# Patient Record
Sex: FEMALE | Race: BLACK OR AFRICAN AMERICAN | NOT HISPANIC OR LATINO | Employment: FULL TIME | URBAN - METROPOLITAN AREA
[De-identification: names, ages, dates, MRNs, and addresses within clinical notes are randomized per-mention and may not be internally consistent; named-entity substitution may affect disease eponyms.]

---

## 2017-01-11 ENCOUNTER — TRANSFERRED RECORDS (OUTPATIENT)
Dept: HEALTH INFORMATION MANAGEMENT | Facility: CLINIC | Age: 57
End: 2017-01-11

## 2017-01-17 ENCOUNTER — OFFICE VISIT (OUTPATIENT)
Dept: FAMILY MEDICINE | Facility: CLINIC | Age: 57
End: 2017-01-17
Payer: COMMERCIAL

## 2017-01-17 VITALS
SYSTOLIC BLOOD PRESSURE: 124 MMHG | DIASTOLIC BLOOD PRESSURE: 78 MMHG | BODY MASS INDEX: 39.68 KG/M2 | HEART RATE: 56 BPM | OXYGEN SATURATION: 93 % | TEMPERATURE: 97.2 F | HEIGHT: 72 IN | WEIGHT: 293 LBS

## 2017-01-17 DIAGNOSIS — E55.9 VITAMIN D DEFICIENCY: ICD-10-CM

## 2017-01-17 DIAGNOSIS — I10 HTN (HYPERTENSION), BENIGN: Primary | ICD-10-CM

## 2017-01-17 DIAGNOSIS — R09.81 NASAL CONGESTION: ICD-10-CM

## 2017-01-17 DIAGNOSIS — F17.200 TOBACCO USE DISORDER: ICD-10-CM

## 2017-01-17 DIAGNOSIS — R53.83 FATIGUE, UNSPECIFIED TYPE: ICD-10-CM

## 2017-01-17 LAB
ANION GAP SERPL CALCULATED.3IONS-SCNC: 8 MMOL/L (ref 3–14)
BUN SERPL-MCNC: 15 MG/DL (ref 7–30)
CALCIUM SERPL-MCNC: 9 MG/DL (ref 8.5–10.1)
CHLORIDE SERPL-SCNC: 105 MMOL/L (ref 94–109)
CO2 SERPL-SCNC: 28 MMOL/L (ref 20–32)
CREAT SERPL-MCNC: 0.91 MG/DL (ref 0.52–1.04)
GFR SERPL CREATININE-BSD FRML MDRD: 64 ML/MIN/1.7M2
GLUCOSE SERPL-MCNC: 92 MG/DL (ref 70–99)
HGB BLD-MCNC: 13.1 G/DL (ref 11.7–15.7)
POTASSIUM SERPL-SCNC: 3.8 MMOL/L (ref 3.4–5.3)
SODIUM SERPL-SCNC: 141 MMOL/L (ref 133–144)

## 2017-01-17 PROCEDURE — 99214 OFFICE O/P EST MOD 30 MIN: CPT | Performed by: PHYSICIAN ASSISTANT

## 2017-01-17 PROCEDURE — 80048 BASIC METABOLIC PNL TOTAL CA: CPT | Performed by: PHYSICIAN ASSISTANT

## 2017-01-17 PROCEDURE — 85018 HEMOGLOBIN: CPT | Performed by: PHYSICIAN ASSISTANT

## 2017-01-17 PROCEDURE — 36415 COLL VENOUS BLD VENIPUNCTURE: CPT | Performed by: PHYSICIAN ASSISTANT

## 2017-01-17 RX ORDER — FLUTICASONE PROPIONATE 50 MCG
1-2 SPRAY, SUSPENSION (ML) NASAL DAILY
Qty: 1 BOTTLE | Refills: 11 | Status: SHIPPED | OUTPATIENT
Start: 2017-01-17 | End: 2017-09-06

## 2017-01-17 NOTE — PROGRESS NOTES
HPI: This is a 57 yo female here with cold sxs and f/u ER visit for a fall.   She is still having some aches and pains due to her fall on 12/12/16 but is getting better.  Currently here with sinus burning x 1 week  Sneezing, watery eyes  She is taking mucinex with some relief  She has nasal congestion and some green nasal d/c but that is clear now  She has some sinus pressure  Denies sore throat or cough or fever or chills.  She smokes 1/2 ppd or less.    Past Medical History   Diagnosis Date     HTN (hypertension), benign      Uterine fibroid      DVT (deep venous thrombosis) (H) 9/2012     Thrombosis of leg      Sept 2012     Past Surgical History   Procedure Laterality Date     Tonsillectomy       [place] teeth       Hysterectomy total abdominal  1/2/2013     Procedure: HYSTERECTOMY TOTAL ABDOMINAL;  TOTAL ABDOMINAL HYSTERECTOMY, multiple myomecty, right salpinectomy lysis of adhesions ;  Surgeon: Tariq Brewer MD;  Location: [facility] OR     Myomectomy uterus  1/2/2013     Procedure: MYOMECTOMY UTERUS;;  Surgeon: Tariq Brewer MD;  Location: [facility] OR     Hysterectomy, pap no longer indicated       Social History   Substance Use Topics     Smoking status: Current Every Day Smoker -- 0.50 packs/day     Smokeless tobacco: Never Used     Alcohol Use: 0.0 oz/week     0 Standard drinks or equivalent per week      Comment: occ       Current Outpatient Prescriptions   Medication Sig Dispense Refill     fluticasone (FLONASE) 50 MCG/ACT spray Spray 1-2 sprays into both nostrils daily 1 Bottle 11     atenolol (TENORMIN) 100 MG tablet TAKE 1 TABLET BY MOUTH DAILY. 90 tablet 1     lisinopril (PRINIVIL,ZESTRIL) 10 MG tablet Take 1 tablet (10 mg) by mouth daily 90 tablet 1     acetaminophen (TYLENOL) 500 MG tablet Take 2 tablets (1,000 mg) by mouth every 6 hours as needed for mild pain 50 tablet 0     COMPRESSION STOCKINGS 22-30mmHg, knee high stockings, wear as directed 2 each 1     Allergies   Allergen Reactions      "No Known Drug Allergy      FAMILY HISTORY NOTED AND REVIEWED    PHYSICAL EXAM:    /78 mmHg  Pulse 56  Temp(Src) 97.2  F (36.2  C) (Oral)  Ht 6' 1\" (1.854 m)  Wt 314 lb (142.429 kg)  BMI 41.44 kg/m2  SpO2 93%  LMP 09/06/2012  Breastfeeding? No    Patient appears non toxic  Ears: bilat cerumen removed with cerumen spoon.  TMs pearly grey with some effusion bilat  Nose: +erythema and edema of nasal mucosa  Pt sounds very congested which is chronic for her  No purulent mucus noted  Throat: no erythema or exudate  Lungs: CTA Bilat  Heart: RRR    Assessment and Plan:     (I10) HTN (hypertension), benign  (primary encounter diagnosis)  Comment: well controlled, cont same  Plan: Basic metabolic panel            (R09.81) Nasal congestion  Comment: chronic congestion, try flonase. If sinus sxs worsen, pt to call me in 2 days and would consider antibiotics at that time.  Plan: fluticasone (FLONASE) 50 MCG/ACT spray            (E55.9) Vitamin D deficiency  Comment: was told by gyn that she should take vit D but isn't  Plan: recd she take vit D 2000U/day    (R53.83) Fatigue, unspecified type  Comment:   Plan: Hemoglobin            (F17.200) Tobacco use disorder  Comment:   Plan: cessation discussed      Yolette Prabhakar PA-C        "

## 2017-01-17 NOTE — NURSING NOTE
"Chief Complaint   Patient presents with     Sinus Problem       Initial /78 mmHg  Pulse 56  Temp(Src) 97.2  F (36.2  C) (Oral)  Ht 6' 1\" (1.854 m)  Wt 314 lb (142.429 kg)  BMI 41.44 kg/m2  SpO2 93%  LMP 09/06/2012  Breastfeeding? No Estimated body mass index is 41.44 kg/(m^2) as calculated from the following:    Height as of this encounter: 6' 1\" (1.854 m).    Weight as of this encounter: 314 lb (142.429 kg).  BP completed using cuff size: large    "

## 2017-01-17 NOTE — Clinical Note
[facility]  [address] [facility]  [address]  [place]  [address]  Tel: [phone]    January 18, 2017    Erlinda Luis Carlos  [address]   [facility] [address]        Dear Ms. Aldridge,      It was a pleasure seeing you.  I wanted to get back to you with your test results.  I have enclosed a copy for your records.    Your blood sugar, electrolytes, kidney function and hemoglobin were all in normal range.    Yolette Prabhakar PA-C      Results for orders placed or performed in visit on 01/17/17   Basic metabolic panel   Result Value Ref Range    Sodium 141 133 - 144 mmol/L    Potassium 3.8 3.4 - 5.3 mmol/L    Chloride 105 94 - 109 mmol/L    Carbon Dioxide 28 20 - 32 mmol/L    Anion Gap 8 3 - 14 mmol/L    Glucose 92 70 - 99 mg/dL    Urea Nitrogen 15 7 - 30 mg/dL    Creatinine 0.91 0.52 - 1.04 mg/dL    GFR Estimate 64 >60 mL/min/1.7m2    GFR Estimate If Black 78 >60 mL/min/1.7m2    Calcium 9.0 8.5 - 10.1 mg/dL   Hemoglobin   Result Value Ref Range    Hemoglobin 13.1 11.7 - 15.7 g/dL

## 2017-01-17 NOTE — MR AVS SNAPSHOT
"              After Visit Summary   2017    Erlinda Aldridge    MRN: [id number]           Patient Information     Date Of Birth          1960        Visit Information        Provider Department      2017 10:00 AM Yolette Prabhakar PA-C [facility]        Today's Diagnoses     HTN (hypertension), benign    -  1     Nasal congestion            Follow-ups after your visit        Who to contact     If you have questions or need follow up information about today's clinic visit or your schedule please contact [facility] directly at [phone].  Normal or non-critical lab and imaging results will be communicated to you by Truevisionhart, letter or phone within 4 business days after the clinic has received the results. If you do not hear from us within 7 days, please contact the clinic through Truevisionhart or phone. If you have a critical or abnormal lab result, we will notify you by phone as soon as possible.  Submit refill requests through [employer] or call your pharmacy and they will forward the refill request to us. Please allow 3 business days for your refill to be completed.          Additional Information About Your Visit        MyChart Information     [employer] lets you send messages to your doctor, view your test results, renew your prescriptions, schedule appointments and more. To sign up, go to [email or web address] . Click on \"Log in\" on the left side of the screen, which will take you to the Welcome page. Then click on \"Sign up Now\" on the right side of the page.     You will be asked to enter the access code listed below, as well as some personal information. Please follow the directions to create your username and password.     Your access code is: [email or web address]  Expires: 3/13/2017  9:37 PM     Your access code will  in 90 days. If you need help or a new code, please call your [facility] or [phone].        Care EveryWhere ID     This is your Care EveryWhere ID. This " could be used by other organizations to access your [place] medical records  [id number]        Your Vitals Were     Last Period                   09/06/2012            Blood Pressure from Last 3 Encounters:   01/17/17 124/78   12/13/16 142/83   03/15/16 131/80    Weight from Last 3 Encounters:   12/13/16 302 lb (136.986 kg)   03/15/16 309 lb (140.161 kg)   12/15/15 322 lb (146.058 kg)              We Performed the Following     Basic metabolic panel          Today's Medication Changes          These changes are accurate as of: 1/17/17 10:44 AM.  If you have any questions, ask your nurse or doctor.               Start taking these medicines.        Dose/Directions    fluticasone 50 MCG/ACT spray   Commonly known as:  FLONASE   Used for:  Nasal congestion        Dose:  1-2 spray   Spray 1-2 sprays into both nostrils daily   Quantity:  1 Bottle   Refills:  11            Where to get your medicines      These medications were sent to [facility] [address] [place] [address] AT [address] [address]     Phone:  [phone]    - fluticasone 50 MCG/ACT spray             Primary Care Provider Office Phone # Fax #    Yolette Prabhakar PA-C [phone]       [facility] [address]  [facility] [address]        Thank you!     Thank you for choosing [facility]  for your care. Our goal is always to provide you with excellent care. Hearing back from our patients is one way we can continue to improve our services. Please take a few minutes to complete the written survey that you may receive in the mail after your visit with us. Thank you!             Your Updated Medication List - Protect others around you: Learn how to safely use, store and throw away your medicines at www.disposemymeds.org.          This list is accurate as of: 1/17/17 10:44 AM.  Always use your most recent med list.                   Brand Name Dispense  Instructions for use    acetaminophen 500 MG tablet    TYLENOL    50 tablet    Take 2 tablets (1,000 mg) by mouth every 6 hours as needed for mild pain       atenolol 100 MG tablet    TENORMIN    90 tablet    TAKE 1 TABLET BY MOUTH DAILY.       COMPRESSION STOCKINGS     2 each    22-30mmHg, knee high stockings, wear as directed       fluticasone 50 MCG/ACT spray    FLONASE    1 Bottle    Spray 1-2 sprays into both nostrils daily       lisinopril 10 MG tablet    PRINIVIL/ZESTRIL    90 tablet    Take 1 tablet (10 mg) by mouth daily

## 2017-01-18 NOTE — PROGRESS NOTES
Quick Note:    It was a pleasure seeing you. I wanted to get back to you with your test results. I have enclosed a copy for your records.    Your blood sugar, electrolytes, kidney function and hemoglobin were all in normal range.    Yolette Prabhakar PA-C    ______

## 2017-02-23 DIAGNOSIS — I10 HTN (HYPERTENSION), BENIGN: ICD-10-CM

## 2017-02-23 DIAGNOSIS — R05.9 COUGH: Primary | ICD-10-CM

## 2017-02-23 RX ORDER — CODEINE PHOSPHATE AND GUAIFENESIN 10; 100 MG/5ML; MG/5ML
2 SOLUTION ORAL EVERY 4 HOURS PRN
Qty: 120 ML | Refills: 0 | Status: SHIPPED | OUTPATIENT
Start: 2017-02-23 | End: 2017-05-31

## 2017-02-23 RX ORDER — ATENOLOL 100 MG/1
TABLET ORAL
Qty: 90 TABLET | Refills: 2 | Status: SHIPPED | OUTPATIENT
Start: 2017-02-23 | End: 2017-05-31

## 2017-02-23 NOTE — TELEPHONE ENCOUNTER
Reason for call:  Patient reporting a symptom    Symptom or request: Cold, Cough, fever working itself out, Tired    Duration (how long have symptoms been present): since last week    Have you been treated for this before? No    Additional comments: Please call to discuss    Phone Number patient can be reached at:  Home number on file [phone] (home)    Best Time:  anytime    Can we leave a detailed message on this number:  YES    Call taken on 2/23/2017 at 12:47 PM by Mehran Giang

## 2017-02-23 NOTE — TELEPHONE ENCOUNTER
I called patient and spoke with her.   Reports of having cough, fever last week, and feeling fatigue.   Patient reports taking cough syrup, Niquil, tylenol.   Was difficult to hear patient as she had me on speaker phone.   I asked her to take me off speaker phone.     Patient reports not feeling feverish any more but that she still continues to have cough and fatigue.   Patient is requesting robitussin w/ codeine as she reports taking this last year for cough.     I advised fluids and that it can take up to 10 days for flu like symptoms to resolve.   Patient also requesting Atenolol refill.    PCP: Please advise on Robitussin script.     Annie Jaimes RN      Prescription approved per [facility] Refill Protocol.    atenolol (TENORMIN) 100 MG tablet      Last Written Prescription Date: 8/5/16  Last Fill Quantity: 90, # refills: 1  Last Office Visit with [facility], [facility] or [facility] prescribing provider: 1/17/17       Potassium   Date Value Ref Range Status   01/17/2017 3.8 3.4 - 5.3 mmol/L Final     Creatinine   Date Value Ref Range Status   01/17/2017 0.91 0.52 - 1.04 mg/dL Final     BP Readings from Last 3 Encounters:   01/17/17 124/78   12/13/16 142/83   03/15/16 131/80

## 2017-03-14 DIAGNOSIS — I10 ESSENTIAL HYPERTENSION: ICD-10-CM

## 2017-03-14 NOTE — TELEPHONE ENCOUNTER
Pending Prescriptions:                       Disp   Refills    atenolol (TENORMIN) 100 MG tablet [Pharmac*90 tab*0        Sig: TAKE 1 TABLET BY MOUTH DAILY.    Last Written Prescription Date: 2/23/2017  Last Fill Quantity: 90 tablet, # refills: 2    Last Office Visit with [facility], [facility] or [facility] prescribing provider:  1/17/2017   Future Office Visit:        BP Readings from Last 3 Encounters:   01/17/17 124/78   12/13/16 142/83   03/15/16 131/80

## 2017-03-15 RX ORDER — ATENOLOL 100 MG/1
TABLET ORAL
Qty: 90 TABLET | Refills: 2 | Status: SHIPPED | OUTPATIENT
Start: 2017-03-15 | End: 2017-07-19

## 2017-03-15 NOTE — TELEPHONE ENCOUNTER
Prescription approved per [facility] Refill Protocol.  Reanna Anderson RN

## 2017-05-31 ENCOUNTER — OFFICE VISIT (OUTPATIENT)
Dept: FAMILY MEDICINE | Facility: CLINIC | Age: 57
End: 2017-05-31
Payer: COMMERCIAL

## 2017-05-31 VITALS
WEIGHT: 293 LBS | HEART RATE: 54 BPM | HEIGHT: 72 IN | TEMPERATURE: 98.7 F | OXYGEN SATURATION: 96 % | BODY MASS INDEX: 39.68 KG/M2 | DIASTOLIC BLOOD PRESSURE: 80 MMHG | SYSTOLIC BLOOD PRESSURE: 144 MMHG

## 2017-05-31 DIAGNOSIS — F17.200 TOBACCO USE DISORDER: ICD-10-CM

## 2017-05-31 DIAGNOSIS — Z12.11 SCREEN FOR COLON CANCER: ICD-10-CM

## 2017-05-31 DIAGNOSIS — K21.9 GASTROESOPHAGEAL REFLUX DISEASE, ESOPHAGITIS PRESENCE NOT SPECIFIED: ICD-10-CM

## 2017-05-31 DIAGNOSIS — E78.5 HYPERLIPIDEMIA LDL GOAL <130: ICD-10-CM

## 2017-05-31 DIAGNOSIS — I10 HTN (HYPERTENSION), BENIGN: ICD-10-CM

## 2017-05-31 DIAGNOSIS — Z11.59 NEED FOR HEPATITIS C SCREENING TEST: ICD-10-CM

## 2017-05-31 DIAGNOSIS — L72.3 SEBACEOUS CYST: Primary | ICD-10-CM

## 2017-05-31 PROCEDURE — 86803 HEPATITIS C AB TEST: CPT | Performed by: PHYSICIAN ASSISTANT

## 2017-05-31 PROCEDURE — 80061 LIPID PANEL: CPT | Performed by: PHYSICIAN ASSISTANT

## 2017-05-31 PROCEDURE — 36415 COLL VENOUS BLD VENIPUNCTURE: CPT | Performed by: PHYSICIAN ASSISTANT

## 2017-05-31 PROCEDURE — 99214 OFFICE O/P EST MOD 30 MIN: CPT | Performed by: PHYSICIAN ASSISTANT

## 2017-05-31 RX ORDER — OMEPRAZOLE 40 MG/1
40 CAPSULE, DELAYED RELEASE ORAL DAILY
Qty: 30 CAPSULE | Refills: 6 | Status: SHIPPED | OUTPATIENT
Start: 2017-05-31 | End: 2020-05-20

## 2017-05-31 NOTE — NURSING NOTE
"Chief Complaint   Patient presents with     Derm Problem     boil right side of back       Initial /78 (BP Location: Left arm, Cuff Size: Adult Large)  Pulse 54  Temp 98.7  F (37.1  C) (Oral)  Ht 6' 1\" (1.854 m)  Wt (!) 322 lb (146.1 kg)  LMP 09/06/2012  SpO2 96%  BMI 42.48 kg/m2 Estimated body mass index is 42.48 kg/(m^2) as calculated from the following:    Height as of this encounter: 6' 1\" (1.854 m).    Weight as of this encounter: 322 lb (146.1 kg).  Medication Reconciliation: complete.      Komal Parsons CMA      "

## 2017-05-31 NOTE — PATIENT INSTRUCTIONS
Recheck your blood pressure and call in readings.  I will recheck your cholesterol  Avoid fried foods, watch the portion sizes and quit smoking  Try to exercise regularly

## 2017-05-31 NOTE — LETTER
[facility]  [address] [facility]  [address]  [place]  [address]  Tel: [phone]    June 2, 2017    Erlinda Aldridge  [address]  [address]   [facility] [address]        Molly,     Your cholesterol level is better than it was 7 months ago but still high.     Your LDL goal is <130.     I would like you to start a low dose of a cholesterol medication called Lipitor.   This medication is taken at bedtime.   I will send in a prescription to your pharmacy.     Have your cholesterol rechecked fasting in 3-6 months.     Your hepatitis C screening test was negative.     If you have any further questions or problems, please contact our office.      Sincerely,    Yolette Prabhakar PA-C          Enclosure: Lab Results                    Results for orders placed or performed in visit on 05/31/17   Hepatitis C Screen Reflex to HCV RNA Quant and Genotype   Result Value Ref Range    Hepatitis C Antibody  NR     Nonreactive   Assay performance characteristics have not been established for newborns,   infants, and children     Lipid Profile   Result Value Ref Range    Cholesterol 209 (H) <200 mg/dL    Triglycerides 109 <150 mg/dL    HDL Cholesterol 44 (L) >49 mg/dL    LDL Cholesterol Calculated 143 (H) <100 mg/dL    Non HDL Cholesterol 165 (H) <130 mg/dL

## 2017-05-31 NOTE — MR AVS SNAPSHOT
After Visit Summary   5/31/2017    Erlinda Aldridge    MRN: [id number]           Patient Information     Date Of Birth          1960        Visit Information        Provider Department      5/31/2017 11:00 AM Yolette Prabhakar PA-C [facility]        Today's Diagnoses     Sebaceous cyst    -  1    HTN (hypertension), benign        Hyperlipidemia LDL goal <130        Tobacco use disorder        Screen for colon cancer        Need for hepatitis C screening test          Care Instructions    Recheck your blood pressure and call in readings.  I will recheck your cholesterol  Avoid fried foods, watch the portion sizes and quit smoking  Try to exercise regularly          Follow-ups after your visit        Future tests that were ordered for you today     Open Future Orders        Priority Expected Expires Ordered    Fecal colorectal cancer screen FIT - Future (S+30) Routine 6/21/2017 6/30/2017 5/31/2017            Who to contact     If you have questions or need follow up information about today's clinic visit or your schedule please contact [facility] directly at [phone].  Normal or non-critical lab and imaging results will be communicated to you by MyChart, letter or phone within 4 business days after the clinic has received the results. If you do not hear from us within 7 days, please contact the clinic through MoviePasshart or phone. If you have a critical or abnormal lab result, we will notify you by phone as soon as possible.  Submit refill requests through [employer] or call your pharmacy and they will forward the refill request to us. Please allow 3 business days for your refill to be completed.          Additional Information About Your Visit        Care EveryWhere ID     This is your Care EveryWhere ID. This could be used by other organizations to access your [place] medical records  [id number]        Your Vitals Were     Pulse Temperature Height Last Period Pulse Oximetry  "BMI (Body Mass Index)    54 98.7  F (37.1  C) (Oral) 6' 1\" (1.854 m) 09/06/2012 96% 42.48 kg/m2       Blood Pressure from Last 3 Encounters:   05/31/17 144/80   01/17/17 124/78   12/13/16 142/83    Weight from Last 3 Encounters:   05/31/17 (!) 322 lb (146.1 kg)   01/17/17 (!) 314 lb (142.4 kg)   12/13/16 (!) 302 lb (137 kg)              We Performed the Following     Hepatitis C Screen Reflex to HCV RNA Quant and Genotype     Lipid Profile          Today's Medication Changes          These changes are accurate as of: 5/31/17 11:38 AM.  If you have any questions, ask your nurse or doctor.               These medicines have changed or have updated prescriptions.        Dose/Directions    atenolol 100 MG tablet   Commonly known as:  TENORMIN   This may have changed:  Another medication with the same name was removed. Continue taking this medication, and follow the directions you see here.   Used for:  Essential hypertension   Changed by:  Yolette Prabhakar PA-C        TAKE 1 TABLET BY MOUTH DAILY.   Quantity:  90 tablet   Refills:  2         Stop taking these medicines if you haven't already. Please contact your care team if you have questions.     guaiFENesin-codeine 100-10 MG/5ML Soln solution   Commonly known as:  ROBITUSSIN AC   Stopped by:  Yolette Prabhakar PA-C                    Primary Care Provider Office Phone # Fax #    Yolette Prabhakar PA-C [phone]       [facility] [address]  [facility] [address]        Thank you!     Thank you for choosing [facility]  for your care. Our goal is always to provide you with excellent care. Hearing back from our patients is one way we can continue to improve our services. Please take a few minutes to complete the written survey that you may receive in the mail after your visit with us. Thank you!             Your Updated Medication List - Protect others around you: Learn how to safely use, store and throw away your medicines " at www.disposemymeds.org.          This list is accurate as of: 5/31/17 11:38 AM.  Always use your most recent med list.                   Brand Name Dispense Instructions for use    acetaminophen 500 MG tablet    TYLENOL    50 tablet    Take 2 tablets (1,000 mg) by mouth every 6 hours as needed for mild pain       atenolol 100 MG tablet    TENORMIN    90 tablet    TAKE 1 TABLET BY MOUTH DAILY.       COMPRESSION STOCKINGS     2 each    22-30mmHg, knee high stockings, wear as directed       fluticasone 50 MCG/ACT spray    FLONASE    1 Bottle    Spray 1-2 sprays into both nostrils daily       lisinopril 10 MG tablet    PRINIVIL/ZESTRIL    90 tablet    TAKE 1 TABLET(10 MG) BY MOUTH DAILY

## 2017-06-01 LAB
CHOLEST SERPL-MCNC: 209 MG/DL
HCV AB SERPL QL IA: NORMAL
HDLC SERPL-MCNC: 44 MG/DL
LDLC SERPL CALC-MCNC: 143 MG/DL
NONHDLC SERPL-MCNC: 165 MG/DL
TRIGL SERPL-MCNC: 109 MG/DL

## 2017-06-02 RX ORDER — ATORVASTATIN CALCIUM 10 MG/1
10 TABLET, FILM COATED ORAL DAILY
Qty: 30 TABLET | Refills: 3 | Status: SHIPPED | OUTPATIENT
Start: 2017-06-02 | End: 2018-08-01

## 2017-06-02 NOTE — PROGRESS NOTES
Molly,    Your cholesterol level is better than it was 7 months ago but still high.    Your LDL goal is <130.   I would like you to start a low dose of a cholesterol medication called Lipitor.  This medication is taken at bedtime.  I will send in a prescription to your pharmacy.   Have your cholesterol rechecked fasting in 3-6 months.    Your hepatitis C screening test was negative.    Yolette Prabhakar PA-C

## 2017-07-18 ENCOUNTER — TELEPHONE (OUTPATIENT)
Dept: FAMILY MEDICINE | Facility: CLINIC | Age: 57
End: 2017-07-18

## 2017-07-18 DIAGNOSIS — I10 BENIGN ESSENTIAL HYPERTENSION: Primary | ICD-10-CM

## 2017-07-19 RX ORDER — METOPROLOL SUCCINATE 100 MG/1
100 TABLET, EXTENDED RELEASE ORAL DAILY
Qty: 90 TABLET | Refills: 1 | Status: SHIPPED | OUTPATIENT
Start: 2017-07-19 | End: 2018-01-31

## 2017-07-25 DIAGNOSIS — I10 HTN (HYPERTENSION), BENIGN: ICD-10-CM

## 2017-07-25 NOTE — TELEPHONE ENCOUNTER
lisinopril (PRINIVIL/ZESTRIL) 10 MG tablet        Last Written Prescription Date: 2/9/2017  Last Fill Quantity: 90, # refills: 1  Last Office Visit with [facility], [facility] or [facility] prescribing provider: 5/31/2017       Potassium   Date Value Ref Range Status   01/17/2017 3.8 3.4 - 5.3 mmol/L Final     Creatinine   Date Value Ref Range Status   01/17/2017 0.91 0.52 - 1.04 mg/dL Final     BP Readings from Last 3 Encounters:   05/31/17 144/80   01/17/17 124/78   12/13/16 142/83

## 2017-07-27 RX ORDER — LISINOPRIL 10 MG/1
TABLET ORAL
Qty: 90 TABLET | Refills: 1 | Status: SHIPPED | OUTPATIENT
Start: 2017-07-27 | End: 2018-03-05

## 2017-07-27 NOTE — TELEPHONE ENCOUNTER
Prescription approved per [facility] Refill Protocol.  Rossana Walker RN

## 2017-09-06 ENCOUNTER — OFFICE VISIT (OUTPATIENT)
Dept: FAMILY MEDICINE | Facility: CLINIC | Age: 57
End: 2017-09-06
Payer: COMMERCIAL

## 2017-09-06 VITALS
SYSTOLIC BLOOD PRESSURE: 136 MMHG | TEMPERATURE: 97.2 F | BODY MASS INDEX: 39.68 KG/M2 | WEIGHT: 293 LBS | HEART RATE: 62 BPM | HEIGHT: 72 IN | OXYGEN SATURATION: 95 % | DIASTOLIC BLOOD PRESSURE: 87 MMHG

## 2017-09-06 DIAGNOSIS — J01.90 ACUTE SINUSITIS WITH SYMPTOMS > 10 DAYS: Primary | ICD-10-CM

## 2017-09-06 DIAGNOSIS — R09.81 NASAL CONGESTION: ICD-10-CM

## 2017-09-06 PROCEDURE — 99213 OFFICE O/P EST LOW 20 MIN: CPT | Performed by: PHYSICIAN ASSISTANT

## 2017-09-06 RX ORDER — FLUTICASONE PROPIONATE 50 MCG
1-2 SPRAY, SUSPENSION (ML) NASAL DAILY
Qty: 1 BOTTLE | Refills: 11 | Status: SHIPPED | OUTPATIENT
Start: 2017-09-06 | End: 2021-01-28

## 2017-09-06 NOTE — MR AVS SNAPSHOT
"              After Visit Summary   2017    Erlinda Aldridge    MRN: [id number]           Patient Information     Date Of Birth          1960        Visit Information        Provider Department      2017 3:30 PM Yolette Prabhakar PA-C [facility]        Today's Diagnoses     Acute sinusitis with symptoms > 10 days    -  1    Nasal congestion           Follow-ups after your visit        Who to contact     If you have questions or need follow up information about today's clinic visit or your schedule please contact [facility] directly at [phone].  Normal or non-critical lab and imaging results will be communicated to you by LucidMediahart, letter or phone within 4 business days after the clinic has received the results. If you do not hear from us within 7 days, please contact the clinic through LucidMediahart or phone. If you have a critical or abnormal lab result, we will notify you by phone as soon as possible.  Submit refill requests through Ripstone or call your pharmacy and they will forward the refill request to us. Please allow 3 business days for your refill to be completed.          Additional Information About Your Visit        MyChart Information     Ripstone lets you send messages to your doctor, view your test results, renew your prescriptions, schedule appointments and more. To sign up, go to [email or web address] . Click on \"Log in\" on the left side of the screen, which will take you to the Welcome page. Then click on \"Sign up Now\" on the right side of the page.     You will be asked to enter the access code listed below, as well as some personal information. Please follow the directions to create your username and password.     Your access code is: [email or web address]  Expires: 2017  3:54 PM     Your access code will  in 90 days. If you need help or a new code, please call your [facility] or [phone].        Care EveryWhere ID     This is your Care EveryWhere ID. " "This could be used by other organizations to access your House medical records  [id number]        Your Vitals Were     Pulse Temperature Height Last Period Pulse Oximetry BMI (Body Mass Index)    62 97.2  F (36.2  C) (Oral) 6' 1\" (1.854 m) 09/06/2012 95% 41.69 kg/m2       Blood Pressure from Last 3 Encounters:   09/06/17 136/87   05/31/17 144/80   01/17/17 124/78    Weight from Last 3 Encounters:   09/06/17 (!) 316 lb (143.3 kg)   05/31/17 (!) 322 lb (146.1 kg)   01/17/17 (!) 314 lb (142.4 kg)              Today, you had the following     No orders found for display         Today's Medication Changes          These changes are accurate as of: 9/6/17  3:54 PM.  If you have any questions, ask your nurse or doctor.               Start taking these medicines.        Dose/Directions    amoxicillin-clavulanate 875-125 MG per tablet   Commonly known as:  AUGMENTIN   Used for:  Acute sinusitis with symptoms > 10 days   Started by:  Yolette Prabhakar PA-C        Dose:  1 tablet   Take 1 tablet by mouth 2 times daily   Quantity:  20 tablet   Refills:  0            Where to get your medicines      These medications were sent to [facility] [address] AT [address] [address]     Phone:  [phone]     amoxicillin-clavulanate 875-125 MG per tablet    fluticasone 50 MCG/ACT spray                Primary Care Provider Office Phone # Fax #    Yolette Prabhakar PA-C [phone] [address]  [facility] [address]        Equal Access to Services     IBRAHIMA BRANDT AH: Wicho Taylor, waelijah mcnamara, qajackieta kaalmada adekatlynda, harley diaz. [facility] [phone].    ATENCIÓN: Si habla español, tiene a orozco disposición servicios gratuitos de asistencia lingüística. Llame al [phone].    We comply with applicable federal civil rights laws and [place] laws. We do not discriminate on the " basis of race, color, national origin, age, disability sex, sexual orientation or gender identity.            Thank you!     Thank you for choosing [facility]  for your care. Our goal is always to provide you with excellent care. Hearing back from our patients is one way we can continue to improve our services. Please take a few minutes to complete the written survey that you may receive in the mail after your visit with us. Thank you!             Your Updated Medication List - Protect others around you: Learn how to safely use, store and throw away your medicines at www.disposemymeds.org.          This list is accurate as of: 9/6/17  3:54 PM.  Always use your most recent med list.                   Brand Name Dispense Instructions for use Diagnosis    acetaminophen 500 MG tablet    TYLENOL    50 tablet    Take 2 tablets (1,000 mg) by mouth every 6 hours as needed for mild pain        amoxicillin-clavulanate 875-125 MG per tablet    AUGMENTIN    20 tablet    Take 1 tablet by mouth 2 times daily    Acute sinusitis with symptoms > 10 days       atorvastatin 10 MG tablet    LIPITOR    30 tablet    Take 1 tablet (10 mg) by mouth daily    Hyperlipidemia LDL goal <130       COMPRESSION STOCKINGS     2 each    22-30mmHg, knee high stockings, wear as directed    DVT (deep venous thrombosis) (H)       fluticasone 50 MCG/ACT spray    FLONASE    1 Bottle    Spray 1-2 sprays into both nostrils daily    Nasal congestion       lisinopril 10 MG tablet    PRINIVIL/ZESTRIL    90 tablet    TAKE 1 TABLET(10 MG) BY MOUTH DAILY    HTN (hypertension), benign       metoprolol 100 MG 24 hr tablet    TOPROL-XL    90 tablet    Take 1 tablet (100 mg) by mouth daily    Benign essential hypertension       omeprazole 40 MG capsule    priLOSEC    30 capsule    Take 1 capsule (40 mg) by mouth daily Take 30-60 minutes before a meal.    Gastroesophageal reflux disease, esophagitis presence not specified

## 2017-09-06 NOTE — NURSING NOTE
"Chief Complaint   Patient presents with     Sinus Problem     for 2 weeks       Initial /87 (BP Location: Right arm, Cuff Size: Adult Large)  Pulse 62  Temp 97.2  F (36.2  C) (Oral)  Ht 6' 1\" (1.854 m)  Wt (!) 316 lb (143.3 kg)  LMP 09/06/2012  SpO2 95%  BMI 41.69 kg/m2 Estimated body mass index is 41.69 kg/(m^2) as calculated from the following:    Height as of this encounter: 6' 1\" (1.854 m).    Weight as of this encounter: 316 lb (143.3 kg).  Medication Reconciliation: complete       Komal Parsons CMA      "

## 2017-09-06 NOTE — PROGRESS NOTES
HPI: 58 yo female here with R ear plugged and sinus congestion x 2 weeks  Taking Dayquil, Claritin, Robitussin without relief  Has assoc cough as well  Cough is prod of green sputum in the morning with the Mucinex  Has some sneezing  Has some post nasal drip  She is not using her Flonase  Denies sore throat or fever/chills.    Past Medical History:   Diagnosis Date     DVT (deep venous thrombosis) (H) 9/2012     HTN (hypertension), benign      Thrombosis of leg     Sept 2012     Uterine fibroid      Past Surgical History:   Procedure Laterality Date     HYSTERECTOMY TOTAL ABDOMINAL  1/2/2013    Procedure: HYSTERECTOMY TOTAL ABDOMINAL;  TOTAL ABDOMINAL HYSTERECTOMY, multiple myomecty, right salpinectomy lysis of adhesions ;  Surgeon: Tariq Brewer MD;  Location:  OR     HYSTERECTOMY, PAP NO LONGER INDICATED       MYOMECTOMY UTERUS  1/2/2013    Procedure: MYOMECTOMY UTERUS;;  Surgeon: Tariq Brewer MD;  Location:  OR     TONSILLECTOMY       wisdom teeth       Social History   Substance Use Topics     Smoking status: Current Every Day Smoker     Packs/day: 0.50     Smokeless tobacco: Never Used     Alcohol use 0.0 oz/week     0 Standard drinks or equivalent per week      Comment: occ       Current Outpatient Prescriptions   Medication Sig Dispense Refill     fluticasone (FLONASE) 50 MCG/ACT spray Spray 1-2 sprays into both nostrils daily 1 Bottle 11     amoxicillin-clavulanate (AUGMENTIN) 875-125 MG per tablet Take 1 tablet by mouth 2 times daily 20 tablet 0     lisinopril (PRINIVIL/ZESTRIL) 10 MG tablet TAKE 1 TABLET(10 MG) BY MOUTH DAILY 90 tablet 1     metoprolol (TOPROL-XL) 100 MG 24 hr tablet Take 1 tablet (100 mg) by mouth daily 90 tablet 1     atorvastatin (LIPITOR) 10 MG tablet Take 1 tablet (10 mg) by mouth daily 30 tablet 3     omeprazole (PRILOSEC) 40 MG capsule Take 1 capsule (40 mg) by mouth daily Take 30-60 minutes before a meal. 30 capsule 6     acetaminophen (TYLENOL) 500 MG  "tablet Take 2 tablets (1,000 mg) by mouth every 6 hours as needed for mild pain 50 tablet 0     COMPRESSION STOCKINGS 22-30mmHg, knee high stockings, wear as directed 2 each 1     Allergies   Allergen Reactions     No Known Drug Allergy        PHYSICAL EXAM:    /87 (BP Location: Right arm, Cuff Size: Adult Large)  Pulse 62  Temp 97.2  F (36.2  C) (Oral)  Ht 6' 1\" (1.854 m)  Wt (!) 316 lb (143.3 kg)  LMP 09/06/2012  SpO2 95%  BMI 41.69 kg/m2    Patient sounds congested  Ears: TMs pearly grey  Throat: mildly erythematous without exudates  Nose: +erythema and edema of mucosa  Neck: no LA  Lungs: CTA Bilat  Heart: RRR    Assessment and Plan:     (J01.90) Acute sinusitis with symptoms > 10 days  (primary encounter diagnosis)  Comment:   Plan: amoxicillin-clavulanate (AUGMENTIN) 875-125 MG         per tablet        Bid with food x 10 d    (R09.81) Nasal congestion  Comment: somewhat chronic  Plan: Restart fluticasone (FLONASE) 50 MCG/ACT spray              Yolette Prabhakar PA-C      "

## 2017-12-07 PROCEDURE — 82274 ASSAY TEST FOR BLOOD FECAL: CPT | Performed by: PHYSICIAN ASSISTANT

## 2017-12-08 DIAGNOSIS — Z12.11 SCREEN FOR COLON CANCER: ICD-10-CM

## 2017-12-08 NOTE — LETTER
[facility] [place] [facility]  [address] [facility]  [address]  [place]  [address]  Tel: [phone]    December 11, 2017    Erlinda Aldridge  [address]  [address]   [facility] [address]        Molly,    Your stool fit test was negative for blood.   If you have any further questions or problems, please contact our office.      Sincerely,    Yolette rPabhakar PA-C/[place],[facility]    Enclosure: Lab Results    Results for orders placed or performed in visit on 12/08/17   Fecal colorectal cancer screen FIT - Future (S+30)   Result Value Ref Range    Occult Blood Scn FIT Negative NEG^Negative

## 2017-12-09 LAB — HEMOCCULT STL QL IA: NEGATIVE

## 2018-01-11 ENCOUNTER — TRANSFERRED RECORDS (OUTPATIENT)
Dept: HEALTH INFORMATION MANAGEMENT | Facility: CLINIC | Age: 58
End: 2018-01-11

## 2018-01-29 ENCOUNTER — OFFICE VISIT (OUTPATIENT)
Dept: FAMILY MEDICINE | Facility: CLINIC | Age: 58
End: 2018-01-29
Payer: COMMERCIAL

## 2018-01-29 VITALS
HEIGHT: 72 IN | HEART RATE: 67 BPM | DIASTOLIC BLOOD PRESSURE: 92 MMHG | WEIGHT: 293 LBS | SYSTOLIC BLOOD PRESSURE: 160 MMHG | BODY MASS INDEX: 39.68 KG/M2 | TEMPERATURE: 97.4 F | OXYGEN SATURATION: 98 %

## 2018-01-29 DIAGNOSIS — I10 HTN (HYPERTENSION), BENIGN: ICD-10-CM

## 2018-01-29 DIAGNOSIS — J01.90 ACUTE SINUSITIS WITH SYMPTOMS > 10 DAYS: Primary | ICD-10-CM

## 2018-01-29 DIAGNOSIS — B37.31 VAGINAL YEAST INFECTION: ICD-10-CM

## 2018-01-29 DIAGNOSIS — E66.01 MORBID OBESITY (H): ICD-10-CM

## 2018-01-29 PROCEDURE — 99213 OFFICE O/P EST LOW 20 MIN: CPT | Performed by: PHYSICIAN ASSISTANT

## 2018-01-29 RX ORDER — PREDNISONE 20 MG/1
20 TABLET ORAL DAILY
Qty: 5 TABLET | Refills: 0 | Status: SHIPPED | OUTPATIENT
Start: 2018-01-29 | End: 2018-08-01

## 2018-01-29 RX ORDER — FLUCONAZOLE 150 MG/1
150 TABLET ORAL ONCE
Qty: 1 TABLET | Refills: 0 | Status: SHIPPED | OUTPATIENT
Start: 2018-01-29 | End: 2018-01-29

## 2018-01-29 NOTE — NURSING NOTE
"Chief Complaint   Patient presents with     Sinus Problem       Initial BP (!) 160/92 (Cuff Size: Adult Large)  Pulse 67  Temp 97.4  F (36.3  C) (Tympanic)  Ht 6' 1\" (1.854 m)  Wt (!) 320 lb (145.2 kg)  LMP 09/06/2012  SpO2 98%  Breastfeeding? No  BMI 42.22 kg/m2 Estimated body mass index is 42.22 kg/(m^2) as calculated from the following:    Height as of this encounter: 6' 1\" (1.854 m).    Weight as of this encounter: 320 lb (145.2 kg).  Medication Reconciliation: incomplete      Arrived late , not reviewed by me.  BP checkedafter visit & elevated --pt mentioned had not taken BP med but will.       "

## 2018-01-29 NOTE — MR AVS SNAPSHOT
"              After Visit Summary   2018    Erlinda Aldridge    MRN: [id number]           Patient Information     Date Of Birth          1960        Visit Information        Provider Department      2018 11:30 AM Yolette Prabhakar PA-C [facility]        Today's Diagnoses     Vaginal yeast infection    -  1    Acute sinusitis with symptoms > 10 days           Follow-ups after your visit        Who to contact     If you have questions or need follow up information about today's clinic visit or your schedule please contact [facility] directly at [phone].  Normal or non-critical lab and imaging results will be communicated to you by Perlegen Scienceshart, letter or phone within 4 business days after the clinic has received the results. If you do not hear from us within 7 days, please contact the clinic through Perle[employer] or phone. If you have a critical or abnormal lab result, we will notify you by phone as soon as possible.  Submit refill requests through Mixpanel or call your pharmacy and they will forward the refill request to us. Please allow 3 business days for your refill to be completed.          Additional Information About Your Visit        MyChart Information     Mixpanel lets you send messages to your doctor, view your test results, renew your prescriptions, schedule appointments and more. To sign up, go to [email or web address] . Click on \"Log in\" on the left side of the screen, which will take you to the Welcome page. Then click on \"Sign up Now\" on the right side of the page.     You will be asked to enter the access code listed below, as well as some personal information. Please follow the directions to create your username and password.     Your access code is: [email or web address]  Expires: 2018 11:55 AM     Your access code will  in 90 days. If you need help or a new code, please call your [facility] or [phone].        Care EveryWhere ID     This is your Care " EveryWhere ID. This could be used by other organizations to access your [facility] medical records  [id number]        Your Vitals Were     Last Period                   09/06/2012            Blood Pressure from Last 3 Encounters:   09/06/17 136/87   05/31/17 144/80   01/17/17 124/78    Weight from Last 3 Encounters:   09/06/17 (!) 316 lb (143.3 kg)   05/31/17 (!) 322 lb (146.1 kg)   01/17/17 (!) 314 lb (142.4 kg)              Today, you had the following     No orders found for display         Today's Medication Changes          These changes are accurate as of 1/29/18 11:55 AM.  If you have any questions, ask your nurse or doctor.               Start taking these medicines.        Dose/Directions    fluconazole 150 MG tablet   Commonly known as:  DIFLUCAN   Used for:  Vaginal yeast infection        Dose:  150 mg   Take 1 tablet (150 mg) by mouth once for 1 dose   Quantity:  1 tablet   Refills:  0       predniSONE 20 MG tablet   Commonly known as:  DELTASONE   Used for:  Acute sinusitis with symptoms > 10 days        Dose:  20 mg   Take 1 tablet (20 mg) by mouth daily   Quantity:  5 tablet   Refills:  0            Where to get your medicines      These medications were sent to [facility] [address] [place] [address] AT [address] [address]     Phone:  [phone]     amoxicillin-clavulanate 875-125 MG per tablet    fluconazole 150 MG tablet    predniSONE 20 MG tablet                Primary Care Provider Office Phone # Fax #    Yolette Prabhakar PA-C [phone] [address]  [facility] [address]        Equal Access to Services     IBRAHIMA BRANDT AH: Hadii jose penn Sorajan, waaxda luqadaha, qaybta kaalmada harley watson. So [facility] [phone].    ATENCIÓN: Si habla español, tiene a orozco disposición servicios gratuitos de asistencia lingüística. Llame al [phone].    We comply with  applicable federal civil rights laws and [place] laws. We do not discriminate on the basis of race, color, national origin, age, disability, sex, sexual orientation, or gender identity.            Thank you!     Thank you for choosing [facility]  for your care. Our goal is always to provide you with excellent care. Hearing back from our patients is one way we can continue to improve our services. Please take a few minutes to complete the written survey that you may receive in the mail after your visit with us. Thank you!             Your Updated Medication List - Protect others around you: Learn how to safely use, store and throw away your medicines at www.disposemymeds.org.          This list is accurate as of 1/29/18 11:55 AM.  Always use your most recent med list.                   Brand Name Dispense Instructions for use Diagnosis    acetaminophen 500 MG tablet    TYLENOL    50 tablet    Take 2 tablets (1,000 mg) by mouth every 6 hours as needed for mild pain        amoxicillin-clavulanate 875-125 MG per tablet    AUGMENTIN    20 tablet    Take 1 tablet by mouth 2 times daily    Acute sinusitis with symptoms > 10 days       atorvastatin 10 MG tablet    LIPITOR    30 tablet    Take 1 tablet (10 mg) by mouth daily    Hyperlipidemia LDL goal <130       COMPRESSION STOCKINGS     2 each    22-30mmHg, knee high stockings, wear as directed    DVT (deep venous thrombosis) (H)       fluconazole 150 MG tablet    DIFLUCAN    1 tablet    Take 1 tablet (150 mg) by mouth once for 1 dose    Vaginal yeast infection       fluticasone 50 MCG/ACT spray    FLONASE    1 Bottle    Spray 1-2 sprays into both nostrils daily    Nasal congestion       lisinopril 10 MG tablet    PRINIVIL/ZESTRIL    90 tablet    TAKE 1 TABLET(10 MG) BY MOUTH DAILY    HTN (hypertension), benign       metoprolol succinate 100 MG 24 hr tablet    TOPROL-XL    90 tablet    Take 1 tablet (100 mg) by mouth daily    Benign essential hypertension        omeprazole 40 MG capsule    priLOSEC    30 capsule    Take 1 capsule (40 mg) by mouth daily Take 30-60 minutes before a meal.    Gastroesophageal reflux disease, esophagitis presence not specified       predniSONE 20 MG tablet    DELTASONE    5 tablet    Take 1 tablet (20 mg) by mouth daily    Acute sinusitis with symptoms > 10 days

## 2018-01-29 NOTE — PROGRESS NOTES
HPI: 56 yo female here for one week of nasal congestion, ears both clogged, sinus pain  She has post nasal drip but not blowing out anything  Has sinus pain and headache and pressure  No cough   Slight sore throat  She just isn't getting better  She has tried some Dayquil and tylenol without relief  She had similar problems in Sept and augmentin cleared this up and she'd like to try that again  She is NOT using her flonase regularly.      Past Medical History:   Diagnosis Date     DVT (deep venous thrombosis) (H) 9/2012     HTN (hypertension), benign      Thrombosis of leg     Sept 2012     Uterine fibroid      Past Surgical History:   Procedure Laterality Date     HYSTERECTOMY TOTAL ABDOMINAL  1/2/2013    Procedure: HYSTERECTOMY TOTAL ABDOMINAL;  TOTAL ABDOMINAL HYSTERECTOMY, multiple myomecty, right salpinectomy lysis of adhesions ;  Surgeon: Tariq Brewer MD;  Location:  OR     HYSTERECTOMY, PAP NO LONGER INDICATED       MYOMECTOMY UTERUS  1/2/2013    Procedure: MYOMECTOMY UTERUS;;  Surgeon: Tariq Brewer MD;  Location:  OR     TONSILLECTOMY       wisdom teeth       Social History   Substance Use Topics     Smoking status: Current Every Day Smoker     Packs/day: 0.50     Smokeless tobacco: Never Used     Alcohol use 0.0 oz/week     0 Standard drinks or equivalent per week      Comment: occ       Current Outpatient Prescriptions   Medication Sig Dispense Refill     amoxicillin-clavulanate (AUGMENTIN) 875-125 MG per tablet Take 1 tablet by mouth 2 times daily 20 tablet 0     predniSONE (DELTASONE) 20 MG tablet Take 1 tablet (20 mg) by mouth daily 5 tablet 0     fluconazole (DIFLUCAN) 150 MG tablet Take 1 tablet (150 mg) by mouth once for 1 dose 1 tablet 0     fluticasone (FLONASE) 50 MCG/ACT spray Spray 1-2 sprays into both nostrils daily 1 Bottle 11     lisinopril (PRINIVIL/ZESTRIL) 10 MG tablet TAKE 1 TABLET(10 MG) BY MOUTH DAILY 90 tablet 1     metoprolol (TOPROL-XL) 100 MG 24 hr tablet  "Take 1 tablet (100 mg) by mouth daily 90 tablet 1     atorvastatin (LIPITOR) 10 MG tablet Take 1 tablet (10 mg) by mouth daily 30 tablet 3     omeprazole (PRILOSEC) 40 MG capsule Take 1 capsule (40 mg) by mouth daily Take 30-60 minutes before a meal. 30 capsule 6     acetaminophen (TYLENOL) 500 MG tablet Take 2 tablets (1,000 mg) by mouth every 6 hours as needed for mild pain 50 tablet 0     COMPRESSION STOCKINGS 22-30mmHg, knee high stockings, wear as directed 2 each 1     Allergies   Allergen Reactions     No Known Drug Allergy      FAMILY HISTORY NOTED AND REVIEWED    PHYSICAL EXAM:    BP (!) 160/92 (Cuff Size: Adult Large)  Pulse 67  Temp 97.4  F (36.3  C) (Tympanic)  Ht 6' 1\" (1.854 m)  Wt (!) 320 lb (145.2 kg)  LMP 09/06/2012  SpO2 98%  Breastfeeding? No  BMI 42.22 kg/m2    Patient sounds congested  Ears: bilat cloudy effusions  Nose: +erythema and edema  Throat: slightly erythematous without exudates  Neck: supple without LA  Lungs: CTA bilat  Heart: RRR    Assessment and Plan:     (J01.90) Acute sinusitis with symptoms > 10 days  (primary encounter diagnosis)  Comment:   Plan: amoxicillin-clavulanate (AUGMENTIN) 875-125 MG         per tablet bid x 10d with food, predniSONE (DELTASONE) 20 MG tablet        Qd x 5d with food    (B37.3) Vaginal yeast infection  Comment: JUST IN CASE  Plan: fluconazole (DIFLUCAN) 150 MG tablet        X 1    (I10) HTN (hypertension), benign  Comment: runs high here  Plan: recd she have this rechecked in [facility]    (E66.01) Morbid obesity (H)  Comment:   Plan: losing wt would also help with BP      Yolette Prabhakar PA-C        "

## 2018-01-31 DIAGNOSIS — I10 BENIGN ESSENTIAL HYPERTENSION: ICD-10-CM

## 2018-02-01 RX ORDER — METOPROLOL SUCCINATE 100 MG/1
TABLET, EXTENDED RELEASE ORAL
Qty: 90 TABLET | Refills: 1 | Status: SHIPPED | OUTPATIENT
Start: 2018-02-01 | End: 2018-08-21

## 2018-02-01 NOTE — TELEPHONE ENCOUNTER
"Last Written Prescription Date:  7/19/2017  Last Fill Quantity: 90,  # refills: 1   Last Office Visit with [facility] provider:  1/29/2018   Future Office Visit:       Requested Prescriptions   Pending Prescriptions Disp Refills     metoprolol succinate (TOPROL-XL) 100 MG 24 hr tablet [Pharmacy Med Name: METOPROLOL ER SUCCINATE 100MG TABS] 90 tablet 0     Sig: TAKE 1 TABLET(100 MG) BY MOUTH DAILY    Beta-Blockers Protocol Failed    1/31/2018  5:45 PM       Failed - Blood pressure under 140/90    BP Readings from Last 3 Encounters:   01/29/18 (!) 160/92   09/06/17 136/87   05/31/17 144/80                Passed - Patient is age 6 or older       Passed - Recent or future visit with authorizing provider's specialty    Patient had office visit in the last year or has a visit in the next 30 days with authorizing provider.  See \"Patient Info\" tab in inbasket, or \"Choose Columns\" in Meds & Orders section of the refill encounter.               "

## 2018-03-05 DIAGNOSIS — I10 HTN (HYPERTENSION), BENIGN: ICD-10-CM

## 2018-03-05 NOTE — TELEPHONE ENCOUNTER
"lisinopril (PRINIVIL/ZESTRIL) 10 MG tablet 90 tablet 1 7/27/2017     Last Written Prescription Date:  7/27/17  Last Fill Quantity: 90,  # refills: 1   Last office visit: 1/29/2018 with prescribing provider:  Yolette Prabhakar   Future Office Visit:  none    Requested Prescriptions   Pending Prescriptions Disp Refills     lisinopril (PRINIVIL/ZESTRIL) 10 MG tablet [Pharmacy Med Name: LISINOPRIL 10MG TABLETS] 90 tablet 0     Sig: TAKE 1 TABLET(10 MG) BY MOUTH DAILY    ACE Inhibitors (Including Combos) Protocol Failed    3/5/2018  4:03 AM       Failed - Blood pressure under 140/90 in past 12 months    BP Readings from Last 3 Encounters:   01/29/18 (!) 160/92   09/06/17 136/87   05/31/17 144/80                Failed - Normal serum creatinine on file in past 12 months    Recent Labs   Lab Test  01/17/17   1104   CR  0.91            Failed - Normal serum potassium on file in past 12 months    Recent Labs   Lab Test  01/17/17   1104   POTASSIUM  3.8            Passed - Recent (12 mo) or future (30 days) visit within the authorizing provider's specialty    Patient had office visit in the last year or has a visit in the next 30 days with authorizing provider.  See \"Patient Info\" tab in inbasket, or \"Choose Columns\" in Meds & Orders section of the refill encounter.            Passed - Patient is age 18 or older       Passed - No active pregnancy on record       Passed - No positive pregnancy test in past 12 months        No flowsheet data found.    "

## 2018-03-06 RX ORDER — LISINOPRIL 10 MG/1
TABLET ORAL
Qty: 90 TABLET | Refills: 1 | Status: SHIPPED | OUTPATIENT
Start: 2018-03-06 | End: 2018-09-21

## 2018-03-06 NOTE — TELEPHONE ENCOUNTER
Routing refill request to provider for review/approval because:  Last BP elevated >140/90    Bhumika PEÑA RN

## 2018-07-21 ENCOUNTER — HOSPITAL ENCOUNTER (EMERGENCY)
Facility: CLINIC | Age: 58
Discharge: HOME | End: 2018-07-21
Attending: EMERGENCY MEDICINE | Admitting: EMERGENCY MEDICINE
Payer: COMMERCIAL

## 2018-07-21 VITALS
OXYGEN SATURATION: 98 % | DIASTOLIC BLOOD PRESSURE: 86 MMHG | TEMPERATURE: 98.1 F | HEART RATE: 60 BPM | HEIGHT: 72 IN | WEIGHT: 293 LBS | BODY MASS INDEX: 39.68 KG/M2 | RESPIRATION RATE: 18 BRPM | SYSTOLIC BLOOD PRESSURE: 145 MMHG

## 2018-07-21 DIAGNOSIS — M62.830 BACK MUSCLE SPASM: ICD-10-CM
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
## 2018-07-21 PROCEDURE — 99284 EMERGENCY DEPT VISIT MOD MDM: CPT | Mod: Z6 | Performed by: EMERGENCY MEDICINE

## 2018-07-21 PROCEDURE — 99283 EMERGENCY DEPT VISIT LOW MDM: CPT

## 2018-07-21 RX ORDER — OXYCODONE HYDROCHLORIDE 5 MG/1
5 TABLET ORAL
Qty: 4 TABLET | Refills: 0 | Status: SHIPPED | OUTPATIENT
Start: 2018-07-21 | End: 2018-08-01
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
## 2018-07-21 NOTE — ED AVS SNAPSHOT
[facility], [place], Emergency Department    [address]    [facility] [address]    Phone:  [phone]    Fax:  [phone]                                       Erlinda Aldridge   MRN: [id number]    Department:  [facility], Emergency Department   Date of Visit:  7/21/2018           After Visit Summary Signature Page     I have received my discharge instructions, and my questions have been answered. I have discussed any challenges I see with this plan with the nurse or doctor.    ..........................................................................................................................................  Patient/Patient Representative Signature      ..........................................................................................................................................  Patient Representative Print Name and Relationship to Patient    ..................................................               ................................................  Date                                            Time    ..........................................................................................................................................  Reviewed by Signature/Title    ...................................................              ..............................................  Date                                                            Time
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
## 2018-08-01 ENCOUNTER — OFFICE VISIT (OUTPATIENT)
Dept: FAMILY MEDICINE | Facility: CLINIC | Age: 58
End: 2018-08-01
Payer: COMMERCIAL

## 2018-08-01 VITALS
WEIGHT: 293 LBS | DIASTOLIC BLOOD PRESSURE: 88 MMHG | HEIGHT: 72 IN | SYSTOLIC BLOOD PRESSURE: 144 MMHG | OXYGEN SATURATION: 95 % | BODY MASS INDEX: 39.68 KG/M2 | TEMPERATURE: 97.8 F | HEART RATE: 59 BPM

## 2018-08-01 DIAGNOSIS — M25.532 LEFT WRIST PAIN: ICD-10-CM

## 2018-08-01 DIAGNOSIS — I10 HTN (HYPERTENSION), BENIGN: ICD-10-CM
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
## 2018-08-01 PROCEDURE — 99213 OFFICE O/P EST LOW 20 MIN: CPT | Performed by: PHYSICIAN ASSISTANT
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
## 2018-08-01 NOTE — PATIENT INSTRUCTIONS
Buy a carpal tunnel splint from any pharmacy for the left wrist pain  Wear all of the time; remove to shower.  Aleve 1-2 tabs twice per day; take with food

## 2018-09-21 DIAGNOSIS — I10 HTN (HYPERTENSION), BENIGN: ICD-10-CM

## 2018-09-21 RX ORDER — LISINOPRIL 10 MG/1
TABLET ORAL
Qty: 90 TABLET | Refills: 1 | Status: SHIPPED | OUTPATIENT
Start: 2018-09-21 | End: 2019-03-26

## 2018-09-21 NOTE — TELEPHONE ENCOUNTER
"Routing refill request to provider for review/approval because:  Labs not current: Cr, K,    Requested Prescriptions   Pending Prescriptions Disp Refills     lisinopril (PRINIVIL/ZESTRIL) 10 MG tablet [Pharmacy Med Name: LISINOPRIL 10MG TABLETS] 90 tablet 0    Last Written Prescription Date:  3/6/2018  Last Fill Quantity: 90,  # refills: 1   Last office visit: 8/1/2018 with prescribing provider:  Vanna  Future Office Visit:     Sig: TAKE 1 TABLET(10 MG) BY MOUTH DAILY    ACE Inhibitors (Including Combos) Protocol Failed    9/21/2018  4:00 AM       Failed - Blood pressure under 140/90 in past 12 months    BP Readings from Last 3 Encounters:   08/01/18 144/88   07/21/18 145/86   01/29/18 (!) 160/92                Failed - Normal serum creatinine on file in past 12 months    Recent Labs   Lab Test  01/17/17   1104   CR  0.91            Failed - Normal serum potassium on file in past 12 months    Recent Labs   Lab Test  01/17/17   1104   POTASSIUM  3.8            Passed - Recent (12 mo) or future (30 days) visit within the authorizing provider's specialty    Patient had office visit in the last 12 months or has a visit in the next 30 days with authorizing provider or within the authorizing provider's specialty.  See \"Patient Info\" tab in inbasket, or \"Choose Columns\" in Meds & Orders section of the refill encounter.           Passed - Patient is age 18 or older       Passed - No active pregnancy on record       Passed - No positive pregnancy test in past 12 months        Jessica LANTIGUA RN  [employer] [facility]          "

## 2018-12-03 ENCOUNTER — TRANSFERRED RECORDS (OUTPATIENT)
Dept: HEALTH INFORMATION MANAGEMENT | Facility: CLINIC | Age: 58
End: 2018-12-03

## 2019-02-24 DIAGNOSIS — I10 BENIGN ESSENTIAL HYPERTENSION: ICD-10-CM

## 2019-02-25 NOTE — TELEPHONE ENCOUNTER
"metoprolol succinate (TOPROL-XL) 100 MG 24 hr tablet 90 tablet 1 8/22/2018     Last Written Prescription Date:  8/22/18  Last Fill Quantity: 90,  # refills: 1   Last office visit: 8/1/2018 with prescribing provider:  Vanna Grossman Office Visit:  None    Requested Prescriptions   Pending Prescriptions Disp Refills     metoprolol succinate ER (TOPROL-XL) 100 MG 24 hr tablet [Pharmacy Med Name: METOPROLOL ER SUCCINATE 100MG TABS] 90 tablet 0     Sig: TAKE 1 TABLET(100 MG) BY MOUTH DAILY    Beta-Blockers Protocol Failed - 2/24/2019  4:02 AM       Failed - Blood pressure under 140/90 in past 12 months    BP Readings from Last 3 Encounters:   08/01/18 144/88   07/21/18 145/86   01/29/18 (!) 160/92                Passed - Patient is age 6 or older       Passed - Recent (12 mo) or future (30 days) visit within the authorizing provider's specialty    Patient had office visit in the last 12 months or has a visit in the next 30 days with authorizing provider or within the authorizing provider's specialty.  See \"Patient Info\" tab in inbasket, or \"Choose Columns\" in Meds & Orders section of the refill encounter.             Passed - Medication is active on med list        No flowsheet data found.      "

## 2019-02-26 RX ORDER — METOPROLOL SUCCINATE 100 MG/1
TABLET, EXTENDED RELEASE ORAL
Qty: 30 TABLET | Refills: 0 | Status: SHIPPED | OUTPATIENT
Start: 2019-02-26 | End: 2019-04-15

## 2019-02-26 NOTE — TELEPHONE ENCOUNTER
Routing refill request to provider for review/approval because:  BP out of range.  Also due for annual physical.  Pended #30 and added in pharm comments to schedule.  Please authorize if appropriate.  Thanks,  Lina Bell RN

## 2019-03-26 DIAGNOSIS — I10 HTN (HYPERTENSION), BENIGN: ICD-10-CM

## 2019-03-26 RX ORDER — LISINOPRIL 10 MG/1
TABLET ORAL
Qty: 90 TABLET | Refills: 0 | Status: SHIPPED | OUTPATIENT
Start: 2019-03-26 | End: 2019-04-15

## 2019-03-26 NOTE — TELEPHONE ENCOUNTER
"lisinopril (PRINIVIL/ZESTRIL) 10 MG tablet 90 tablet 1 9/21/2018  No   Sig: TAKE 1 TABLET(10 MG) BY MOUTH DAILY     Last Written Prescription Date:  09/21/2018  Last Fill Quantity: 90,  # refills: 1   Last office visit: 8/1/2018 with prescribing provider:     Future Office Visit:      Requested Prescriptions   Pending Prescriptions Disp Refills     lisinopril (PRINIVIL/ZESTRIL) 10 MG tablet [Pharmacy Med Name: LISINOPRIL 10MG TABLETS] 90 tablet 0     Sig: TAKE 1 TABLET(10 MG) BY MOUTH DAILY    ACE Inhibitors (Including Combos) Protocol Failed - 3/26/2019  4:01 AM       Failed - Blood pressure under 140/90 in past 12 months    BP Readings from Last 3 Encounters:   08/01/18 144/88   07/21/18 145/86   01/29/18 (!) 160/92                Failed - Normal serum creatinine on file in past 12 months    Recent Labs   Lab Test 01/17/17  1104   CR 0.91            Failed - Normal serum potassium on file in past 12 months    Recent Labs   Lab Test 01/17/17  1104   POTASSIUM 3.8            Passed - Recent (12 mo) or future (30 days) visit within the authorizing provider's specialty    Patient had office visit in the last 12 months or has a visit in the next 30 days with authorizing provider or within the authorizing provider's specialty.  See \"Patient Info\" tab in inbasket, or \"Choose Columns\" in Meds & Orders section of the refill encounter.             Passed - Medication is active on med list       Passed - Patient is age 18 or older       Passed - No active pregnancy on record       Passed - No positive pregnancy test within past 12 months            "

## 2019-03-31 DIAGNOSIS — I10 BENIGN ESSENTIAL HYPERTENSION: ICD-10-CM

## 2019-04-01 NOTE — TELEPHONE ENCOUNTER
"metoprolol succinate ER (TOPROL-XL) 100 MG 24 hr tablet     Last Written Prescription Date:  02/26/2019  Last Fill Quantity: 30,  # refills: 0   Last office visit: 8/1/2018 with prescribing provider:  Vanna   Future Office Visit:  Unknown     Requested Prescriptions   Pending Prescriptions Disp Refills     metoprolol succinate ER (TOPROL-XL) 100 MG 24 hr tablet [Pharmacy Med Name: METOPROLOL ER SUCCINATE 100MG TABS] 30 tablet 0     Sig: TAKE 1 TABLET BY MOUTH EVERY DAY    Beta-Blockers Protocol Failed - 3/31/2019  1:48 PM       Failed - Blood pressure under 140/90 in past 12 months    BP Readings from Last 3 Encounters:   08/01/18 144/88   07/21/18 145/86   01/29/18 (!) 160/92                Passed - Patient is age 6 or older       Passed - Recent (12 mo) or future (30 days) visit within the authorizing provider's specialty    Patient had office visit in the last 12 months or has a visit in the next 30 days with authorizing provider or within the authorizing provider's specialty.  See \"Patient Info\" tab in inbasket, or \"Choose Columns\" in Meds & Orders section of the refill encounter.             Passed - Medication is active on med list          "

## 2019-04-02 NOTE — TELEPHONE ENCOUNTER
TC's,  Please schedule pt in for an annual physical and route back to triage.  Thanks,  Lina Bell RN

## 2019-04-15 ENCOUNTER — OFFICE VISIT (OUTPATIENT)
Dept: FAMILY MEDICINE | Facility: CLINIC | Age: 59
End: 2019-04-15
Payer: COMMERCIAL

## 2019-04-15 VITALS
WEIGHT: 293 LBS | OXYGEN SATURATION: 94 % | SYSTOLIC BLOOD PRESSURE: 150 MMHG | TEMPERATURE: 97.4 F | HEIGHT: 72 IN | DIASTOLIC BLOOD PRESSURE: 86 MMHG | BODY MASS INDEX: 39.68 KG/M2 | HEART RATE: 73 BPM

## 2019-04-15 DIAGNOSIS — E78.5 HYPERLIPIDEMIA WITH TARGET LDL LESS THAN 130: ICD-10-CM

## 2019-04-15 DIAGNOSIS — I10 HTN (HYPERTENSION), BENIGN: Primary | ICD-10-CM

## 2019-04-15 DIAGNOSIS — E66.01 MORBID OBESITY (H): ICD-10-CM

## 2019-04-15 PROCEDURE — 99214 OFFICE O/P EST MOD 30 MIN: CPT | Performed by: PHYSICIAN ASSISTANT

## 2019-04-15 PROCEDURE — 36415 COLL VENOUS BLD VENIPUNCTURE: CPT | Performed by: PHYSICIAN ASSISTANT

## 2019-04-15 PROCEDURE — 80053 COMPREHEN METABOLIC PANEL: CPT | Performed by: PHYSICIAN ASSISTANT

## 2019-04-15 PROCEDURE — 80061 LIPID PANEL: CPT | Performed by: PHYSICIAN ASSISTANT

## 2019-04-15 RX ORDER — METOPROLOL SUCCINATE 100 MG/1
100 TABLET, EXTENDED RELEASE ORAL DAILY
Qty: 90 TABLET | Refills: 3 | Status: SHIPPED | OUTPATIENT
Start: 2019-04-15 | End: 2020-04-17

## 2019-04-15 RX ORDER — LISINOPRIL 10 MG/1
10 TABLET ORAL DAILY
Qty: 90 TABLET | Refills: 3 | Status: SHIPPED | OUTPATIENT
Start: 2019-04-15 | End: 2020-04-17

## 2019-04-15 ASSESSMENT — MIFFLIN-ST. JEOR: SCORE: 2218.82

## 2019-04-15 NOTE — PROGRESS NOTES
HPI: pt arrives 20min late for appt  She is here for f/u HTN.  She does not monitor her blood pressure and ran out of her toprol about 2 weeks ago.  She could do this at work but typically too busy.  Feels well without concerns.  Had her px and mammogram with gyn Dr. Jaime last fall so will request those records.    Past Medical History:   Diagnosis Date     DVT (deep venous thrombosis) (H) 9/2012     HTN (hypertension), benign      Thrombosis of leg     Sept 2012     Uterine fibroid      Past Surgical History:   Procedure Laterality Date     HYSTERECTOMY TOTAL ABDOMINAL  1/2/2013    Procedure: HYSTERECTOMY TOTAL ABDOMINAL;  TOTAL ABDOMINAL HYSTERECTOMY, multiple myomecty, right salpinectomy lysis of adhesions ;  Surgeon: Tariq Brewer MD;  Location: [facility] OR     HYSTERECTOMY, PAP NO LONGER INDICATED       MYOMECTOMY UTERUS  1/2/2013    Procedure: MYOMECTOMY UTERUS;;  Surgeon: Tariq Brewer MD;  Location:  OR     TONSILLECTOMY       wisdom teeth       Social History     Tobacco Use     Smoking status: Current Every Day Smoker     Packs/day: 0.50     Smokeless tobacco: Never Used   Substance Use Topics     Alcohol use: Yes     Alcohol/week: 0.0 oz     Comment: occ       Current Outpatient Medications   Medication Sig Dispense Refill     acetaminophen (TYLENOL) 500 MG tablet Take 2 tablets (1,000 mg) by mouth every 6 hours as needed for mild pain 50 tablet 0     COMPRESSION STOCKINGS 22-30mmHg, knee high stockings, wear as directed 2 each 1     fluticasone (FLONASE) 50 MCG/ACT spray Spray 1-2 sprays into both nostrils daily 1 Bottle 11     lisinopril (PRINIVIL/ZESTRIL) 10 MG tablet Take 1 tablet (10 mg) by mouth daily 90 tablet 3     metoprolol succinate ER (TOPROL-XL) 100 MG 24 hr tablet Take 1 tablet (100 mg) by mouth daily 90 tablet 3     omeprazole (PRILOSEC) 40 MG capsule Take 1 capsule (40 mg) by mouth daily Take 30-60 minutes before a meal. 30 capsule 6     Allergies   Allergen Reactions  "    No Known Drug Allergy      FAMILY HISTORY NOTED AND REVIEWED    PHYSICAL EXAM:    /86   Pulse 73   Temp 97.4  F (36.3  C) (Tympanic)   Ht 1.854 m (6' 1\")   Wt (!) 150.6 kg (332 lb)   LMP 09/06/2012   SpO2 94%   BMI 43.80 kg/m      Patient appears non toxic  Heart: RRR without m/r/g.  Extr: no edema  Psych: approp affect and mood    Assessment and Plan:     (I10) HTN (hypertension), benign  (primary encounter diagnosis)  Comment: high today but has been out of metoprolol.  Plan: Comprehensive metabolic panel, lisinopril         (PRINIVIL/ZESTRIL) 10 MG tablet        And metoprolol refilled. Monitor BP and return for nurse only BP check in the next few weeks.    (E78.5) Hyperlipidemia with target LDL less than 130  Comment:   Plan: Lipid Profile            (E66.01) Morbid obesity (H)  Comment:   Plan: wt loss has been discussed.      Yolette Prabhakar PA-C          "

## 2019-04-15 NOTE — LETTER
[facility]  [address] [facility]  [address]  [place]  [address]  Tel: [phone]    April 16, 2019    Erlinda Oconnordwin  [address]  [address]   [facility] [place] [address]        Dear Ms. Aldridge,    Here is a copy of your lab work as we discussed on the phone.  Your cholesterol is much too high. Your LDL (bad cholesterol) should be <130 and is 171.  Start the lipitor 40mg at bedtime and have your cholesterol rechecked fasting in 3 months.  If you have any further questions or problems, please contact our office.      Sincerely,    Yolette Prbahakar PA-C/KRYSTA          Enclosure: Lab Results  Results for orders placed or performed in visit on 04/15/19   Comprehensive metabolic panel   Result Value Ref Range    Sodium 142 133 - 144 mmol/L    Potassium 4.0 3.4 - 5.3 mmol/L    Chloride 108 94 - 109 mmol/L    Carbon Dioxide 28 20 - 32 mmol/L    Anion Gap 6 3 - 14 mmol/L    Glucose 98 70 - 99 mg/dL    Urea Nitrogen 13 7 - 30 mg/dL    Creatinine 0.71 0.52 - 1.04 mg/dL    GFR Estimate >90 >60 mL/min/[1.73_m2]    GFR Estimate If Black >90 >60 mL/min/[1.73_m2]    Calcium 9.0 8.5 - 10.1 mg/dL    Bilirubin Total 0.2 0.2 - 1.3 mg/dL    Albumin 3.7 3.4 - 5.0 g/dL    Protein Total 7.6 6.8 - 8.8 g/dL    Alkaline Phosphatase 124 40 - 150 U/L    ALT 38 0 - 50 U/L    AST 33 0 - 45 U/L   Lipid Profile   Result Value Ref Range    Cholesterol 240 (H) <200 mg/dL    Triglycerides 109 <150 mg/dL    HDL Cholesterol 47 (L) >49 mg/dL    LDL Cholesterol Calculated 171 (H) <100 mg/dL    Non HDL Cholesterol 193 (H) <130 mg/dL

## 2019-04-16 LAB
ALBUMIN SERPL-MCNC: 3.7 G/DL (ref 3.4–5)
ALP SERPL-CCNC: 124 U/L (ref 40–150)
ALT SERPL W P-5'-P-CCNC: 38 U/L (ref 0–50)
ANION GAP SERPL CALCULATED.3IONS-SCNC: 6 MMOL/L (ref 3–14)
AST SERPL W P-5'-P-CCNC: 33 U/L (ref 0–45)
BILIRUB SERPL-MCNC: 0.2 MG/DL (ref 0.2–1.3)
BUN SERPL-MCNC: 13 MG/DL (ref 7–30)
CALCIUM SERPL-MCNC: 9 MG/DL (ref 8.5–10.1)
CHLORIDE SERPL-SCNC: 108 MMOL/L (ref 94–109)
CHOLEST SERPL-MCNC: 240 MG/DL
CO2 SERPL-SCNC: 28 MMOL/L (ref 20–32)
CREAT SERPL-MCNC: 0.71 MG/DL (ref 0.52–1.04)
GFR SERPL CREATININE-BSD FRML MDRD: >90 ML/MIN/{1.73_M2}
GLUCOSE SERPL-MCNC: 98 MG/DL (ref 70–99)
HDLC SERPL-MCNC: 47 MG/DL
LDLC SERPL CALC-MCNC: 171 MG/DL
NONHDLC SERPL-MCNC: 193 MG/DL
POTASSIUM SERPL-SCNC: 4 MMOL/L (ref 3.4–5.3)
PROT SERPL-MCNC: 7.6 G/DL (ref 6.8–8.8)
SODIUM SERPL-SCNC: 142 MMOL/L (ref 133–144)
TRIGL SERPL-MCNC: 109 MG/DL

## 2019-04-16 RX ORDER — ATORVASTATIN CALCIUM 40 MG/1
40 TABLET, FILM COATED ORAL DAILY
Qty: 90 TABLET | Refills: 1 | Status: SHIPPED | OUTPATIENT
Start: 2019-04-16 | End: 2020-05-20

## 2019-04-16 NOTE — RESULT ENCOUNTER NOTE
Molly,    Here is a copy of your lab work as we discussed on the phone.  Your cholesterol is much too high. Your LDL (bad cholesterol) should be <130 and is 171.  Start the lipitor 40mg at bedtime and have your cholesterol rechecked fasting in 3 months.    Yolette Prabhakar PA-C

## 2019-04-26 RX ORDER — METOPROLOL SUCCINATE 100 MG/1
TABLET, EXTENDED RELEASE ORAL
Qty: 30 TABLET | Refills: 0 | OUTPATIENT
Start: 2019-04-26

## 2019-07-15 DIAGNOSIS — I10 HTN (HYPERTENSION), BENIGN: ICD-10-CM

## 2019-07-15 NOTE — TELEPHONE ENCOUNTER
"lisinopril (PRINIVIL/ZESTRIL) 10 MG tablet 90 tablet 3 4/15/2019     Last Written Prescription Date:  4/15/19  Last Fill Quantity: 90/,  # refills: 3   Last office visit: 4/15/2019 with prescribing provider:  4/15/19   Future Office Visit:  None    Requested Prescriptions   Pending Prescriptions Disp Refills     lisinopril (PRINIVIL/ZESTRIL) 10 MG tablet [Pharmacy Med Name: LISINOPRIL 10MG TABLETS] 90 tablet 0     Sig: TAKE 1 TABLET(10 MG) BY MOUTH DAILY       ACE Inhibitors (Including Combos) Protocol Failed - 7/15/2019  4:02 AM        Failed - Blood pressure under 140/90 in past 12 months     BP Readings from Last 3 Encounters:   04/15/19 150/86   08/01/18 144/88   07/21/18 145/86                 Passed - Recent (12 mo) or future (30 days) visit within the authorizing provider's specialty     Patient had office visit in the last 12 months or has a visit in the next 30 days with authorizing provider or within the authorizing provider's specialty.  See \"Patient Info\" tab in inbasket, or \"Choose Columns\" in Meds & Orders section of the refill encounter.              Passed - Medication is active on med list        Passed - Patient is age 18 or older        Passed - No active pregnancy on record        Passed - Normal serum creatinine on file in past 12 months     Recent Labs   Lab Test 04/15/19  1244   CR 0.71             Passed - Normal serum potassium on file in past 12 months     Recent Labs   Lab Test 04/15/19  1244   POTASSIUM 4.0             Passed - No positive pregnancy test within past 12 months        No flowsheet data found.      "

## 2019-07-16 NOTE — TELEPHONE ENCOUNTER
Reason for Call:  Medication or medication refill:    Do you use a [facility]?  Name of the pharmacy and phone number for the current request:    [id number] - [place], [place] - [facility] [address]      Name of the medication requested: Mfg back order on Atenolol    Other request: due to the Atenolol mfg backorder with no release date, please change to another  Prescription. Patient will come in if need be, she is out of her Medication  Please call once this has been taken care of  Thank You    Can we leave a detailed message on this number? YES    Phone number patient can be reached at: Cell number on file:    Telephone Information:   Mobile [phone]       Best Time: anytime    Call taken on 7/18/2017 at 4:15 PM by Mehran Giang       Elana Montes)  Obstetrics and Gynecology  [address] [facility] [address] [facility] [address]  [place] [address]  Phone: [phone]  Fax: [phone]  Follow Up Time:

## 2019-07-17 RX ORDER — LISINOPRIL 10 MG/1
TABLET ORAL
Refills: 0
Start: 2019-07-17

## 2020-05-18 DIAGNOSIS — I10 HTN (HYPERTENSION), BENIGN: ICD-10-CM

## 2020-05-19 NOTE — TELEPHONE ENCOUNTER
Non-detailed message left for patient to return call.  Please schedule an appointment, patient has not been seen in clinic for over a year.  BOB BejaranoN, RN  [employer] Triage

## 2020-05-20 ENCOUNTER — VIRTUAL VISIT (OUTPATIENT)
Dept: FAMILY MEDICINE | Facility: CLINIC | Age: 60
End: 2020-05-20
Payer: COMMERCIAL

## 2020-05-20 DIAGNOSIS — I10 HTN (HYPERTENSION), BENIGN: Primary | ICD-10-CM

## 2020-05-20 DIAGNOSIS — K21.9 GASTROESOPHAGEAL REFLUX DISEASE, ESOPHAGITIS PRESENCE NOT SPECIFIED: ICD-10-CM

## 2020-05-20 DIAGNOSIS — J01.90 ACUTE NON-RECURRENT SINUSITIS, UNSPECIFIED LOCATION: ICD-10-CM

## 2020-05-20 DIAGNOSIS — E78.5 HYPERLIPIDEMIA WITH TARGET LDL LESS THAN 130: ICD-10-CM

## 2020-05-20 PROCEDURE — 99214 OFFICE O/P EST MOD 30 MIN: CPT | Mod: TEL | Performed by: INTERNAL MEDICINE

## 2020-05-20 RX ORDER — ATORVASTATIN CALCIUM 40 MG/1
40 TABLET, FILM COATED ORAL DAILY
Qty: 90 TABLET | Refills: 1 | Status: SHIPPED | OUTPATIENT
Start: 2020-05-20 | End: 2020-09-15

## 2020-05-20 RX ORDER — OMEPRAZOLE 40 MG/1
40 CAPSULE, DELAYED RELEASE ORAL DAILY
Qty: 90 CAPSULE | Refills: 1 | Status: SHIPPED | OUTPATIENT
Start: 2020-05-20 | End: 2021-02-28

## 2020-05-20 RX ORDER — LISINOPRIL 10 MG/1
10 TABLET ORAL DAILY
Qty: 90 TABLET | Refills: 1 | Status: SHIPPED | OUTPATIENT
Start: 2020-05-20 | End: 2020-09-15

## 2020-05-20 RX ORDER — AZITHROMYCIN 250 MG/1
TABLET, FILM COATED ORAL
Qty: 6 TABLET | Refills: 0 | Status: SHIPPED | OUTPATIENT
Start: 2020-05-20 | End: 2020-05-25

## 2020-05-20 RX ORDER — METOPROLOL SUCCINATE 100 MG/1
100 TABLET, EXTENDED RELEASE ORAL DAILY
Qty: 90 TABLET | Refills: 1 | Status: SHIPPED | OUTPATIENT
Start: 2020-05-20 | End: 2020-12-04

## 2020-05-20 NOTE — PROGRESS NOTES
"Erlinda Aldridge is a 59 year old female who is being evaluated via a billable telephone visit.      The patient has been notified of following:     \"This telephone visit will be conducted via a call between you and your physician/provider. We have found that certain health care needs can be provided without the need for a physical exam.  This service lets us provide the care you need with a short phone conversation.  If a prescription is necessary we can send it directly to your pharmacy.  If lab work is needed we can place an order for that and you can then stop by our lab to have the test done at a later time.    Telephone visits are billed at different rates depending on your insurance coverage. During this emergency period, for some insurers they may be billed the same as an in-person visit.  Please reach out to your insurance provider with any questions.    If during the course of the call the physician/provider feels a telephone visit is not appropriate, you will not be charged for this service.\"    Patient has given verbal consent for Telephone visit?  Yes    What phone number would you like to be contacted at? [phone]    How would you like to obtain your AVS? Broderickhart    Subjective     Erlinda Aldridge is a 59 year old female who presents via phone visit today for the following health issues:    HPI     Patient is needing refills for her medications.    Hypertension continues to be well controlled on metoprolol and lisinopril.    Continues to take atorvastatin for hyperlipidemia.  Tolerating medication well.    GERD continues to be well suppressed with omeprazole.    She has recently developed nasal congestion and has had persistent sinus pain.  Denies fever/chills or cough/shortness of breath.      Review of Systems   Constitutional: Negative for fatigue.   HENT: Negative for sore throat.    Eyes: Negative for visual disturbance.   Respiratory: Negative for cough and shortness of breath.  "   Cardiovascular: Negative for chest pain, palpitations and leg swelling.   Gastrointestinal: Negative for abdominal pain, blood in stool, nausea and vomiting.   Neurological: Negative for dizziness, weakness, light-headedness, numbness and headaches.         ICD-10-CM    1. HTN (hypertension), benign  I10 metoprolol succinate ER (TOPROL-XL) 100 MG 24 hr tablet     lisinopril (ZESTRIL) 10 MG tablet   2. Hyperlipidemia with target LDL less than 130  E78.5 atorvastatin (LIPITOR) 40 MG tablet   3. Gastroesophageal reflux disease, esophagitis presence not specified  K21.9 omeprazole (PRILOSEC) 40 MG DR capsule   4. Acute non-recurrent sinusitis, unspecified location  J01.90 azithromycin (ZITHROMAX) 250 MG tablet       Total time spent with the patient over the phone was 5 minutes.      Dr. Tim Quiros

## 2020-05-21 ASSESSMENT — ENCOUNTER SYMPTOMS
SHORTNESS OF BREATH: 0
ABDOMINAL PAIN: 0
NAUSEA: 0
LIGHT-HEADEDNESS: 0
BLOOD IN STOOL: 0
NUMBNESS: 0
FATIGUE: 0
HEADACHES: 0
SORE THROAT: 0
DIZZINESS: 0
WEAKNESS: 0
PALPITATIONS: 0
COUGH: 0
VOMITING: 0

## 2020-05-27 RX ORDER — LISINOPRIL 10 MG/1
TABLET ORAL
Start: 2020-05-27

## 2020-05-27 RX ORDER — METOPROLOL SUCCINATE 100 MG/1
TABLET, EXTENDED RELEASE ORAL
Start: 2020-05-27

## 2020-05-27 NOTE — TELEPHONE ENCOUNTER
Denied both refills  Patient had virtual visit 5/20/2020  Meds refilled then  Patricia KELLEY RN

## 2020-09-01 ENCOUNTER — TELEPHONE (OUTPATIENT)
Dept: FAMILY MEDICINE | Facility: CLINIC | Age: 60
End: 2020-09-01

## 2020-09-02 ENCOUNTER — OFFICE VISIT (OUTPATIENT)
Dept: URGENT CARE | Facility: URGENT CARE | Age: 60
End: 2020-09-02
Payer: COMMERCIAL

## 2020-09-02 VITALS
BODY MASS INDEX: 42.9 KG/M2 | OXYGEN SATURATION: 97 % | SYSTOLIC BLOOD PRESSURE: 132 MMHG | DIASTOLIC BLOOD PRESSURE: 82 MMHG | WEIGHT: 293 LBS | HEART RATE: 70 BPM | TEMPERATURE: 98.7 F

## 2020-09-02 DIAGNOSIS — M23.92 INTERNAL DERANGEMENT OF KNEE, LEFT: Primary | ICD-10-CM

## 2020-09-02 PROCEDURE — 99213 OFFICE O/P EST LOW 20 MIN: CPT | Performed by: FAMILY MEDICINE

## 2020-09-02 NOTE — PROGRESS NOTES
SUBJECTIVE:  Chief Complaint   Patient presents with     Urgent Care     pt. has left knee swelling since yesterday     Erlinda Aldridge is a 60 year old female who developed  left knee pain/ swelling since  1 days ago. Mechanism of injury: - no injury that she remembers.  Had knee pain with waking from sleep . Immediate symptoms: immediate pain, immediate swelling, was able to bear weight directly after injury, no deformity was noted by the patient. Symptoms have been sudden, unchanged since that time. Prior history of related problems: previous knee injury  With  Limping 5 years ago, but has not had problems since.    She works housekeeping at [employer]    Past Medical History:   Diagnosis Date     DVT (deep venous thrombosis) (H) 9/2012     HTN (hypertension), benign      Thrombosis of leg     Sept 2012     Uterine fibroid      Patient Active Problem List   Diagnosis     HTN (hypertension), benign     Uterine fibroid     DVT (deep venous thrombosis) (H)     S/P hysterectomy     Hyperlipidemia with target LDL less than 130     Tobacco use disorder     Morbid obesity (H)     Vitamin D deficiency       ALLERGIES:  No known drug allergy    MEDs  atorvastatin (LIPITOR) 40 MG tablet, Take 1 tablet (40 mg) by mouth daily  COMPRESSION STOCKINGS, 22-30mmHg, knee high stockings, wear as directed  fluticasone (FLONASE) 50 MCG/ACT spray, Spray 1-2 sprays into both nostrils daily  lisinopril (ZESTRIL) 10 MG tablet, Take 1 tablet (10 mg) by mouth daily  metoprolol succinate ER (TOPROL-XL) 100 MG 24 hr tablet, Take 1 tablet (100 mg) by mouth daily  omeprazole (PRILOSEC) 40 MG DR capsule, Take 1 capsule (40 mg) by mouth daily Take 30-60 minutes before a meal.  acetaminophen (TYLENOL) 500 MG tablet, Take 2 tablets (1,000 mg) by mouth every 6 hours as needed for mild pain (Patient not taking: Reported on 9/2/2020)    No current facility-administered medications on file prior to visit.       Social History     Tobacco Use      Smoking status: Current Every Day Smoker     Packs/day: 0.50     Smokeless tobacco: Never Used   Substance Use Topics     Alcohol use: Yes     Alcohol/week: 0.0 standard drinks     Comment: occ         Family History   Problem Relation Age of Onset     Cerebrovascular Disease Mother         alive age 75     Hypertension Mother      Cancer Father         ? type     Hypertension Father         alive age 83     Hypertension Sister         ROS:  CONSTITUTIONAL:NEGATIVE for fever, chills, change in weight  INTEGUMENTARY/SKIN: NEGATIVE for worrisome rashes,  or lesions  EYES: NEGATIVE for vision changes or irritation  ENT/MOUTH: NEGATIVE for ear, mouth and throat problems  RESP:NEGATIVE for significant cough or SOB  GI: NEGATIVE for nausea, abdominal pain,   or change in bowel habits    OBJECTIVE:  /82   Pulse 70   Temp 98.7  F (37.1  C) (Tympanic)   Wt 147.5 kg (325 lb 3.2 oz)   LMP 09/06/2012   SpO2 97%   BMI 42.90 kg/m    Appearance: alert and cooperative.  Moderate distress    Knee exam: left      pain with ROM of the patella  - inferior to the patella  No significant pain with stress to the medial collateral ligament  No significant pain with stress to the lateral collateral ligament  sharp pain with compression of the meniscus in the medial and lateral compartments  no knee instability with Lachman, but causes sharp pain in her knee     able to bear weight and ambulate with  moderate pain.  Walks slowly and carefully.  Shifting weight onto the left side slowly  There is not compromise to the distal circulation. Distal pulses are 2+ and capillary refill is brisk.  Motor and sensory function distal to the injured knee is normal     EYES:   EOMI,   conjunctiva clear  HENT:   External ears with no swelling or lesions   Nose and lips without  Swelling, ulcers, erythema or lesions  NECK:   normal pain free ROM  RESP:   no labored respirations, no tachypnea  EXTREMITIES:   Full ROM without expression of  pain or limitation x 4 extremities  NEURO:   Normal strength and tone, ambulation without difficulty,   normal speech and mentation  SKIN:  no suspicious lesions or rashes  PSYCH:   mentation and affect appears normal and patient appearance--appropriately groomed     ASSESSMENT:  Internal derangement of knee, left     - Orthopedic & Spine  Referral; Future     We discussed the possible causes of the patient's musculoskeletal pain  ,   Stress/ strain to tendons,  And likely injury to the cartilage or mensicus of the knee        Ibuprofen/  acetaminophen as alternative for pain  Follow-up with primary care or orthopedics if persistent symptoms    May use crutches and/ or cane to assist with ambulating-  Did not want Rx of crutches  Knee splint -  Did not want immobilizer-  Given 2 6 inch ace wraps    Note for work

## 2020-09-02 NOTE — PATIENT INSTRUCTIONS
Patient Education     Knee Pain with Possible Torn Meniscus    The meniscus is a tough cartilage pad that cushions the inside of the knee joint. It helps absorb the shock from movement. It also spreads the weight of your body evenly across the knee joint. This prevents excess wear and tear to the bones of that joint.  The most common causes of meniscal tears are injuries, especially related to sports and degenerative disease that happens with aging.  A meniscus tear commonly happens during a twisting injury when the knee is bent. This causes pain, swelling, reduced movement of the knee, and trouble walking. There may be popping, clicking, joint locking or inability to completely straighten the knee. Ligaments of the knee may also be injured.  A torn meniscus is diagnosed by physical exam and X-rays. In the case of a severe injury, the knee may be too painful to examine fully. A more accurate exam can be done after the initial swelling goes down. An MRI may be done to make a final diagnosis.  If your healthcare provider suspects a meniscal injury, you will treat your knee with ice and rest and preventing movement of the knee. A splint or knee brace that keeps your leg straight may be put on to protect the joint. Depending on the severity of the injury, surgery may be needed. A cartilage injury may take 4 to 12 weeks to heal depending on how bad it is.  Home care    Stay off the injured leg as much as possible until you can walk on it without pain. If you have a lot of pain when walking, crutches or a walker may be prescribed. (These can be rented or bought at many pharmacies and surgical or orthopedic supply stores). Follow your healthcare provider's advice about when to begin putting weight on that leg.    Keep your leg elevated to reduce pain and swelling. When sleeping, place a pillow under the injured leg. When sitting, support the injured leg so it is above heart level. This is very important during the first  48 hours.    Apply an ice pack over the injured area for 15 to 20 minutes every 3 to 6 hours. You should do this for the first 24 to 48 hours. You can make an ice pack by filling a plastic bag that seals at the top with ice cubes and then wrapping it with a thin towel. Continue to use ice packs for relief of pain and swelling as needed. As the ice melts, be careful not to get your wrap, splint, or cast wet. After 48 hours, apply heat (warm shower or warm bath) for 15 to 20 minutes several times a day, or alternate ice and heat. You can place the ice pack directly over the splint. If you have to wear a hook-and-loop knee brace, you can open it to apply the ice pack, or heat, directly to the knee. Never put ice directly on the skin. Always wrap the ice in a towel or other type of cloth.    You may use over-the-counter pain medicine to control pain, unless another pain medicine was prescribed. If you have chronic liver or kidney disease or ever had a stomach ulcer or gastrointestinal bleeding, talk with your healthcare provider before using these medicines.    If you were given a splint, keep it dry at all times. Bathe with your splint out of the water. Protect it with a large plastic bag that is rubber-banded or taped at the top end. If a fiberglass splint gets wet, you can dry it with a hair dryer set on cool. If you have a hook-and-loop knee brace, you can remove this to bathe, unless told otherwise.    Check with your healthcare provider before returning to sports or full work duties.  Follow-up care  Follow up with your healthcare provider, or as advised. This is usually within 1 to 2 weeks. Further testing may be required to check the extent of your injury.  If X-rays were taken, you will be told of any new findings that may affect your care.  Call 911  Call 911 if you have:     Shortness of breath    Chest pain  When to seek medical advice  Call your healthcare provider right away if any of these occur:    Toes  or foot gets swollen, cold, blue, numb, or tingly    Pain or swelling spreads over the knee or calf    Warmth or redness appears over the knee or calf    Fever of 100.4 F (38 C) or higher, or as directed by your healthcare provider    Chills  Date Last Reviewed: 5/1/2018 2000-2019 [employer]. [address] [place] [address]. All rights reserved. This information is not intended as a substitute for professional medical care. Always follow your healthcare professional's instructions.           Patient Education     Kneecap Surgery: Cartilage Removal  Surgery may be used when pain severely limits your activities. Or it may be done when a rehab program or other nonsurgical treatments just are not helping enough. Some procedures may be done using arthroscopy. This method uses tiny cuts (incisions) and special tools to look and work inside the knee joint. Other procedures need open surgery.  Cartilage removal  Debriding  This removes damaged cartilage on the kneecap or thighbone (femur) to create a smoother surface between them.     Damaged cartilage is removed from the back of the kneecap or from the groove in the thighbone. This is often done using arthroscopy.    Burring  This is done when the cartilage is worn down to the bone. Burring into the bone reaches the blood supply. This allows a new fibrous covering to grow.  Recovering from surgery  As you recover, you can aid the healing process by taking it easy at first. Follow your surgeon's instructions. Your knee may be bandaged, wrapped, or iced to keep swelling down. You may be given a brace to protect your knee. This helps improve your range of motion and speed healing. Keep your leg raised above your heart so fluid can drain away and swelling is reduced. Surgery is often followed by a rehabilitation or physical therapy program.  Date Last Reviewed: 5/1/2018 2000-2019 [employer]. [address] [place] [address]  [address]. All rights reserved. This information is not intended as a substitute for professional medical care. Always follow your healthcare professional's instructions.

## 2020-09-02 NOTE — LETTER
[place] [facility]  [address] [address]  [phone]      September 2, 2020    RE:  Erlinda Aldridge                                                                                                                                                       [address]   [facility] [address]            To whom it may concern:    Erlinda Aldridge is under my professional care for Internal derangement of knee, left.   She  may return to work with the following: Light duty-no standing more than 1 hour per shift for 10 days            Sincerely,        Yanira Ospina MD    [facility]

## 2020-09-08 ENCOUNTER — OFFICE VISIT (OUTPATIENT)
Dept: ORTHOPEDICS | Facility: CLINIC | Age: 60
End: 2020-09-08
Payer: COMMERCIAL

## 2020-09-08 ENCOUNTER — ANCILLARY PROCEDURE (OUTPATIENT)
Dept: GENERAL RADIOLOGY | Facility: CLINIC | Age: 60
End: 2020-09-08
Attending: FAMILY MEDICINE
Payer: COMMERCIAL

## 2020-09-08 VITALS
DIASTOLIC BLOOD PRESSURE: 80 MMHG | WEIGHT: 293 LBS | BODY MASS INDEX: 39.68 KG/M2 | HEIGHT: 72 IN | SYSTOLIC BLOOD PRESSURE: 130 MMHG

## 2020-09-08 DIAGNOSIS — M25.562 ACUTE PAIN OF LEFT KNEE: ICD-10-CM

## 2020-09-08 DIAGNOSIS — M25.462 KNEE EFFUSION, LEFT: ICD-10-CM

## 2020-09-08 DIAGNOSIS — M17.12 PRIMARY OSTEOARTHRITIS OF LEFT KNEE: Primary | ICD-10-CM

## 2020-09-08 PROCEDURE — 20610 DRAIN/INJ JOINT/BURSA W/O US: CPT | Mod: LT | Performed by: FAMILY MEDICINE

## 2020-09-08 PROCEDURE — 73562 X-RAY EXAM OF KNEE 3: CPT | Performed by: FAMILY MEDICINE

## 2020-09-08 PROCEDURE — 99204 OFFICE O/P NEW MOD 45 MIN: CPT | Mod: 25 | Performed by: FAMILY MEDICINE

## 2020-09-08 RX ORDER — BETAMETHASONE SODIUM PHOSPHATE AND BETAMETHASONE ACETATE 3; 3 MG/ML; MG/ML
12 INJECTION, SUSPENSION INTRA-ARTICULAR; INTRALESIONAL; INTRAMUSCULAR; SOFT TISSUE
Status: SHIPPED | OUTPATIENT
Start: 2020-09-08

## 2020-09-08 RX ORDER — DICLOFENAC SODIUM 75 MG/1
75 TABLET, DELAYED RELEASE ORAL 2 TIMES DAILY PRN
Qty: 60 TABLET | Refills: 0 | Status: SHIPPED | OUTPATIENT
Start: 2020-09-08 | End: 2020-09-15

## 2020-09-08 RX ORDER — ROPIVACAINE HYDROCHLORIDE 5 MG/ML
3 INJECTION, SOLUTION EPIDURAL; INFILTRATION; PERINEURAL
Status: SHIPPED | OUTPATIENT
Start: 2020-09-08

## 2020-09-08 RX ADMIN — BETAMETHASONE SODIUM PHOSPHATE AND BETAMETHASONE ACETATE 12 MG: 3; 3 INJECTION, SUSPENSION INTRA-ARTICULAR; INTRALESIONAL; INTRAMUSCULAR; SOFT TISSUE at 15:10

## 2020-09-08 RX ADMIN — ROPIVACAINE HYDROCHLORIDE 3 ML: 5 INJECTION, SOLUTION EPIDURAL; INFILTRATION; PERINEURAL at 15:10

## 2020-09-08 ASSESSMENT — MIFFLIN-ST. JEOR: SCORE: 2224.69

## 2020-09-08 NOTE — LETTER
9/8/2020         RE: Erlinda Aldridge  [address]  [facility] [address]        Dear Colleague,    Thank you for referring your patient, Erlinda Aldridge, to the  SPORTS MEDICINE. Please see a copy of my visit note below.    HPI     [facility]   Clinic Visit s Sep 8, 2020    PCP: Yolette Prabhakar    ASSESSMENT/PLAN    ICD-10-CM    1. Primary osteoarthritis of left knee  M17.12 diclofenac (VOLTAREN) 75 MG EC tablet     Large Joint Injection/Arthocentesis: L knee joint   2. Knee effusion, left  M25.462 diclofenac (VOLTAREN) 75 MG EC tablet     Large Joint Injection/Arthocentesis: L knee joint        Marked effusion of the left knee in the setting of advanced degenerative arthritis, negative end-stage, signifying acute flare of osteoarthritis.  Offered cortisone injection for acute pain control, accompanied by oral diclofenac and recommendations for icing 20 minutes up to hourly if needed.  Okay for being off work through the end of the week.  Follow-up as needed.        Today's Visit:  Erlinda is a 60 year old female who is seen in consultation at the request of Dr. Ospina for   Chief Complaint   Patient presents with     Left Knee - Pain       Injury: Reports insidious onset without acute precipitating event. Notes that she did fall a couple years ago and fell flat on her knees       Location of Pain: left knee anterior , nonradiating   Duration of Pain: acute, 1 week(s),   Rating of Pain at worst: 10/10  Rating of Pain Currently: 8/10  Pain is better with: pain medication: oxyCODONE-acetaminophen (PERCOCET)   Pain is worse with: trying to lift her leg - such as getting in the shower, bending and walking   Treatment so far consists of: ibuprofen and UC  Associated symptoms: swelling Moderate  Recent imaging completed: No recent imaging completed. Completed today  Prior History of related problems: fall 2 years ago    Social History: is employed as a/an  at [employer]       Past Medical History:   Diagnosis Date     DVT (deep venous thrombosis) (H) 9/2012     HTN (hypertension), benign      Thrombosis of leg     Sept 2012     Uterine fibroid        Patient Active Problem List    Diagnosis Date Noted     Vitamin D deficiency 01/17/2017     Priority: Medium     Tobacco use disorder 06/10/2014     Priority: Medium     Morbid obesity (H) 06/10/2014     Priority: Medium     Hyperlipidemia with target LDL less than 130 12/17/2013     Priority: Medium     Diagnosis updated by automated process. Provider to review and confirm.       S/P hysterectomy 01/05/2013     Priority: Medium     DVT (deep venous thrombosis) (H) 09/27/2012     Priority: Medium     HTN (hypertension), benign      Priority: Medium     Uterine fibroid      Priority: Medium       Family History   Problem Relation Age of Onset     Cerebrovascular Disease Mother         alive age 75     Hypertension Mother      Cancer Father         ? type     Hypertension Father         alive age 83     Hypertension Sister        Social History     Socioeconomic History     Marital status: Single     Spouse name: None     Number of children: None     Years of education: None     Highest education level: None   Occupational History     Occupation: house keeper     Employer: [employer],[address]   Social Needs     Financial resource strain: None     Food insecurity     Worry: None     Inability: None     Transportation needs     Medical: None     Non-medical: None   Tobacco Use     Smoking status: Current Every Day Smoker     Packs/day: 0.50     Smokeless tobacco: Never Used   Substance and Sexual Activity     Alcohol use: Yes     Alcohol/week: 0.0 standard drinks     Comment: occ       Drug use: No     Sexual activity: Yes     Partners: Male   Lifestyle     Physical activity     Days per week: None     Minutes per session: None     Stress: None   Relationships     Social connections     Talks on phone: None     Gets  "together: None     Attends Zoroastrian service: None     Active member of club or organization: None     Attends meetings of clubs or organizations: None     Relationship status: None     Intimate partner violence     Fear of current or ex partner: None     Emotionally abused: None     Physically abused: None     Forced sexual activity: None   Other Topics Concern     None   Social History Narrative    Works housekeeping at [employer]    Single    2 sisters       Past Surgical History:   Procedure Laterality Date     HYSTERECTOMY TOTAL ABDOMINAL  1/2/2013    Procedure: HYSTERECTOMY TOTAL ABDOMINAL;  TOTAL ABDOMINAL HYSTERECTOMY, multiple myomecty, right salpinectomy lysis of adhesions ;  Surgeon: Tariq Brewer MD;  Location:  OR     HYSTERECTOMY, PAP NO LONGER INDICATED       MYOMECTOMY UTERUS  1/2/2013    Procedure: MYOMECTOMY UTERUS;;  Surgeon: Tariq Brewer MD;  Location:  OR     TONSILLECTOMY       wisdom teeth             Review of Systems   Musculoskeletal: Positive for joint pain.   All other systems reviewed and are negative.        Physical Exam  Musculoskeletal:      Left knee: She exhibits effusion.         /80 (BP Location: Right arm, Patient Position: Chair)   Ht 1.93 m (6' 4\")   Wt 147.4 kg (325 lb)   LMP 09/06/2012   BMI 39.56 kg/m    Constitutional:well-developed, well-nourished, and in no distress.   Cardiovascular: Intact distal pulses.    Neurological: alert. Gait Abnormal:   Antalgic gait  Skin: Skin is warm and dry.   Psychiatric: Mood and affect normal.   Respiratory: unlabored, speaks in full sentences  Lymph: no LAD, no lymphangitis          Left Knee Exam     Tenderness   The patient is experiencing tenderness in the medial joint line and lateral joint line.    Range of Motion   Extension: normal   Flexion:  90 abnormal     Tests   Pilar:  Medial - positive Lateral - positive  Varus: negative Valgus: negative  Lachman:  Anterior - negative      Drawer:  " Anterior - negative     Posterior - negative  Patellar apprehension: negative    Other   Erythema: absent  Scars: absent  Sensation: normal  Pulse: present  Swelling: severe  Effusion: effusion present            X-ray images Ordered and independently reviewed by me in the office today with the patient. X-ray shows:   Recent Results (from the past 48 hour(s))   XR Knee Standing AP Bilat [place] Bilat Lat Left    Narrative    9/8/2020    Advanced bilateral degenerative disease of the bilateral knees, with   advanced narrowing of the medial compartment bilaterally, left greater   than right medial compartment osteophytosis, irregularity of the   patellofemoral joint on the right particularly on the patellar surface,   and mild osteophyte formation of the lateral aspect of the patella   bilaterally.  No acute fractures or soft tissue deformities.       Large Joint Injection/Arthocentesis: L knee joint    Date/Time: 9/8/2020 3:10 PM  Performed by: Alexey Dean MD  Authorized by: Alexey Dean MD     Indications:  Pain  Needle Size:  25 G  Guidance: landmark guided    Location:  Knee      Medications:  12 mg betamethasone acet & sod phos 6 (3-3) MG/ML; 3 mL ropivacaine 5 MG/ML  Outcome:  Tolerated well, no immediate complications  Procedure discussed: discussed risks, benefits, and alternatives    Timeout: timeout called immediately prior to procedure    Prep: patient was prepped and draped in usual sterile fashion            Again, thank you for allowing me to participate in the care of your patient.        Sincerely,        Alexey Dean MD

## 2020-09-08 NOTE — PROGRESS NOTES
[facility]   Clinic Visit s Sep 8, 2020    PCP: Yolette Prabhakar    ASSESSMENT/PLAN    ICD-10-CM    1. Primary osteoarthritis of left knee  M17.12 diclofenac (VOLTAREN) 75 MG EC tablet     Large Joint Injection/Arthocentesis: L knee joint   2. Knee effusion, left  M25.462 diclofenac (VOLTAREN) 75 MG EC tablet     Large Joint Injection/Arthocentesis: L knee joint        Marked effusion of the left knee in the setting of advanced degenerative arthritis, negative end-stage, signifying acute flare of osteoarthritis.  Offered cortisone injection for acute pain control, accompanied by oral diclofenac and recommendations for icing 20 minutes up to hourly if needed.  Okay for being off work through the end of the week.  Follow-up as needed.        Today's Visit:  Erlinda is a 60 year old female who is seen in consultation at the request of Dr. Ospina for   Chief Complaint   Patient presents with     Left Knee - Pain       Injury: Reports insidious onset without acute precipitating event. Notes that she did fall a couple years ago and fell flat on her knees       Location of Pain: left knee anterior , nonradiating   Duration of Pain: acute, 1 week(s),   Rating of Pain at worst: 10/10  Rating of Pain Currently: 8/10  Pain is better with: pain medication: oxyCODONE-acetaminophen (PERCOCET)   Pain is worse with: trying to lift her leg - such as getting in the shower, bending and walking   Treatment so far consists of: ibuprofen and UC  Associated symptoms: swelling Moderate  Recent imaging completed: No recent imaging completed. Completed today  Prior History of related problems: fall 2 years ago    Social History: is employed as a/an  at [employer]      Past Medical History:   Diagnosis Date     DVT (deep venous thrombosis) (H) 9/2012     HTN (hypertension), benign      Thrombosis of leg     Sept 2012     Uterine fibroid        Patient Active Problem List    Diagnosis Date Noted      Vitamin D deficiency 01/17/2017     Priority: Medium     Tobacco use disorder 06/10/2014     Priority: Medium     Morbid obesity (H) 06/10/2014     Priority: Medium     Hyperlipidemia with target LDL less than 130 12/17/2013     Priority: Medium     Diagnosis updated by automated process. Provider to review and confirm.       S/P hysterectomy 01/05/2013     Priority: Medium     DVT (deep venous thrombosis) (H) 09/27/2012     Priority: Medium     HTN (hypertension), benign      Priority: Medium     Uterine fibroid      Priority: Medium       Family History   Problem Relation Age of Onset     Cerebrovascular Disease Mother         alive age 75     Hypertension Mother      Cancer Father         ? type     Hypertension Father         alive age 83     Hypertension Sister        Social History     Socioeconomic History     Marital status: Single     Spouse name: None     Number of children: None     Years of education: None     Highest education level: None   Occupational History     Occupation: house keeper     Employer: [employer] [address]   Social Needs     Financial resource strain: None     Food insecurity     Worry: None     Inability: None     Transportation needs     Medical: None     Non-medical: None   Tobacco Use     Smoking status: Current Every Day Smoker     Packs/day: 0.50     Smokeless tobacco: Never Used   Substance and Sexual Activity     Alcohol use: Yes     Alcohol/week: 0.0 standard drinks     Comment: occ       Drug use: No     Sexual activity: Yes     Partners: Male   Lifestyle     Physical activity     Days per week: None     Minutes per session: None     Stress: None   Relationships     Social connections     Talks on phone: None     Gets together: None     Attends [employer] service: None     Active member of club or organization: None     Attends meetings of clubs or organizations: None     Relationship status: None     Intimate partner violence     Fear of current or ex  "partner: None     Emotionally abused: None     Physically abused: None     Forced sexual activity: None   Other Topics Concern     None   Social History Narrative    Works housekeeping at [employer].    Single    2 sisters       Past Surgical History:   Procedure Laterality Date     HYSTERECTOMY TOTAL ABDOMINAL  1/2/2013    Procedure: HYSTERECTOMY TOTAL ABDOMINAL;  TOTAL ABDOMINAL HYSTERECTOMY, multiple myomecty, right salpinectomy lysis of adhesions ;  Surgeon: Tariq Brewer MD;  Location: [facility] OR     HYSTERECTOMY, PAP NO LONGER INDICATED       MYOMECTOMY UTERUS  1/2/2013    Procedure: MYOMECTOMY UTERUS;;  Surgeon: Tariq Brewer MD;  Location:  OR     TONSILLECTOMY       wisdom teeth             Review of Systems   Musculoskeletal: Positive for joint pain.   All other systems reviewed and are negative.        Physical Exam  Musculoskeletal:      Left knee: She exhibits effusion.         /80 (BP Location: Right arm, Patient Position: Chair)   Ht 1.93 m (6' 4\")   Wt 147.4 kg (325 lb)   LMP 09/06/2012   BMI 39.56 kg/m    Constitutional:well-developed, well-nourished, and in no distress.   Cardiovascular: Intact distal pulses.    Neurological: alert. Gait Abnormal:   Antalgic gait  Skin: Skin is warm and dry.   Psychiatric: Mood and affect normal.   Respiratory: unlabored, speaks in full sentences  Lymph: no LAD, no lymphangitis          Left Knee Exam     Tenderness   The patient is experiencing tenderness in the medial joint line and lateral joint line.    Range of Motion   Extension: normal   Flexion:  90 abnormal     Tests   Pilar:  Medial - positive Lateral - positive  Varus: negative Valgus: negative  Lachman:  Anterior - negative      Drawer:  Anterior - negative     Posterior - negative  Patellar apprehension: negative    Other   Erythema: absent  Scars: absent  Sensation: normal  Pulse: present  Swelling: severe  Effusion: effusion present            X-ray images Ordered and " independently reviewed by me in the office today with the patient. X-ray shows:   Recent Results (from the past 48 hour(s))   XR Knee Standing AP Bilat [place] Bilat Lat Left    Narrative    9/8/2020    Advanced bilateral degenerative disease of the bilateral knees, with   advanced narrowing of the medial compartment bilaterally, left greater   than right medial compartment osteophytosis, irregularity of the   patellofemoral joint on the right particularly on the patellar surface,   and mild osteophyte formation of the lateral aspect of the patella   bilaterally.  No acute fractures or soft tissue deformities.       Large Joint Injection/Arthocentesis: L knee joint    Date/Time: 9/8/2020 3:10 PM  Performed by: Alexey Dean MD  Authorized by: Alexey Dean MD     Indications:  Pain  Needle Size:  25 G  Guidance: landmark guided    Location:  Knee      Medications:  12 mg betamethasone acet & sod phos 6 (3-3) MG/ML; 3 mL ropivacaine 5 MG/ML  Outcome:  Tolerated well, no immediate complications  Procedure discussed: discussed risks, benefits, and alternatives    Timeout: timeout called immediately prior to procedure    Prep: patient was prepped and draped in usual sterile fashion

## 2020-09-08 NOTE — LETTER
SPORTS MEDICINE  [address] [address]  Phone: [phone]  Fax: [phone]    09/08/20    Erlinda Aldridge  [address]   [facility] [address]      To whom it may concern:     Please excuse Molly from work over the next 3 days, through Friday 9/11/2020, due to acute knee pain.     Sincerely,      Alexey Dean MD

## 2020-09-09 NOTE — TELEPHONE ENCOUNTER
Patient has not called back   Has been seeing sports medicine for knee pain  Will close this encounter  Patricia KELLEY RN

## 2020-09-15 ENCOUNTER — OFFICE VISIT (OUTPATIENT)
Dept: FAMILY MEDICINE | Facility: CLINIC | Age: 60
End: 2020-09-15
Payer: COMMERCIAL

## 2020-09-15 VITALS
OXYGEN SATURATION: 98 % | HEIGHT: 72 IN | DIASTOLIC BLOOD PRESSURE: 88 MMHG | BODY MASS INDEX: 39.68 KG/M2 | SYSTOLIC BLOOD PRESSURE: 148 MMHG | HEART RATE: 60 BPM | WEIGHT: 293 LBS

## 2020-09-15 DIAGNOSIS — E78.5 HYPERLIPIDEMIA WITH TARGET LDL LESS THAN 130: ICD-10-CM

## 2020-09-15 DIAGNOSIS — F17.200 TOBACCO USE DISORDER: ICD-10-CM

## 2020-09-15 DIAGNOSIS — J31.0 CHRONIC RHINITIS: ICD-10-CM

## 2020-09-15 DIAGNOSIS — I10 HTN (HYPERTENSION), BENIGN: ICD-10-CM

## 2020-09-15 DIAGNOSIS — H61.22 CERUMINOSIS, LEFT: Primary | ICD-10-CM

## 2020-09-15 DIAGNOSIS — M17.0 PRIMARY OSTEOARTHRITIS OF BOTH KNEES: ICD-10-CM

## 2020-09-15 DIAGNOSIS — E66.01 MORBID OBESITY (H): ICD-10-CM

## 2020-09-15 PROCEDURE — 80061 LIPID PANEL: CPT | Performed by: PHYSICIAN ASSISTANT

## 2020-09-15 PROCEDURE — 99214 OFFICE O/P EST MOD 30 MIN: CPT | Performed by: PHYSICIAN ASSISTANT

## 2020-09-15 PROCEDURE — 36415 COLL VENOUS BLD VENIPUNCTURE: CPT | Performed by: PHYSICIAN ASSISTANT

## 2020-09-15 PROCEDURE — 80053 COMPREHEN METABOLIC PANEL: CPT | Performed by: PHYSICIAN ASSISTANT

## 2020-09-15 RX ORDER — LISINOPRIL 10 MG/1
10 TABLET ORAL DAILY
Qty: 90 TABLET | Refills: 1 | Status: SHIPPED | OUTPATIENT
Start: 2020-09-15 | End: 2021-01-28

## 2020-09-15 RX ORDER — ACETAMINOPHEN 500 MG
1000 TABLET ORAL EVERY 6 HOURS PRN
Qty: 50 TABLET | Refills: 0 | Status: SHIPPED | OUTPATIENT
Start: 2020-09-15

## 2020-09-15 RX ORDER — ATORVASTATIN CALCIUM 40 MG/1
40 TABLET, FILM COATED ORAL DAILY
Qty: 90 TABLET | Refills: 1 | Status: SHIPPED | OUTPATIENT
Start: 2020-09-15 | End: 2021-06-01

## 2020-09-15 ASSESSMENT — MIFFLIN-ST. JEOR: SCORE: 2224.69

## 2020-09-15 NOTE — PROGRESS NOTES
Subjective     Erlinda Aldridge is a 60 year old female who presents to clinic today for the following health issues:    HPI     Pt did see Dr. Dean for her L knee and had cortisone inj which did help  She has bilat OA knees  She was prescribed diclofenac     She is working at [employer]    Her ears are clogged and thinks she needs ear wash    Pt has chronic sinus congestion and some headaches.  She notes that the Zpack in May really did help  She is only sporadically using her flonase  Not taking an antihistamine    HTN: She doesn't monitor this outside of the office  Had a co worker pass away so she is stressed about that.  She is compliant with taking her BP pills  Continues to smoke 1/2 ppd    Past Medical History:   Diagnosis Date     DVT (deep venous thrombosis) (H) 9/2012     HTN (hypertension), benign      Thrombosis of leg     Sept 2012     Uterine fibroid      Past Surgical History:   Procedure Laterality Date     HYSTERECTOMY TOTAL ABDOMINAL  1/2/2013    Procedure: HYSTERECTOMY TOTAL ABDOMINAL;  TOTAL ABDOMINAL HYSTERECTOMY, multiple myomecty, right salpinectomy lysis of adhesions ;  Surgeon: Tariq Brewer MD;  Location:  OR     HYSTERECTOMY, PAP NO LONGER INDICATED       MYOMECTOMY UTERUS  1/2/2013    Procedure: MYOMECTOMY UTERUS;;  Surgeon: Tariq Brewer MD;  Location:  OR     TONSILLECTOMY       wisdom teeth       Social History     Tobacco Use     Smoking status: Current Every Day Smoker     Packs/day: 0.50     Smokeless tobacco: Never Used   Substance Use Topics     Alcohol use: Yes     Alcohol/week: 0.0 standard drinks     Comment: occ       Current Outpatient Medications   Medication Sig Dispense Refill     acetaminophen (TYLENOL) 500 MG tablet Take 2 tablets (1,000 mg) by mouth every 6 hours as needed for mild pain 50 tablet 0     atorvastatin (LIPITOR) 40 MG tablet Take 1 tablet (40 mg) by mouth daily 90 tablet 1     COMPRESSION STOCKINGS 22-30mmHg, knee high stockings, wear  "as directed 2 each 1     fluticasone (FLONASE) 50 MCG/ACT spray Spray 1-2 sprays into both nostrils daily 1 Bottle 11     lisinopril (ZESTRIL) 10 MG tablet Take 1 tablet (10 mg) by mouth daily 90 tablet 1     metoprolol succinate ER (TOPROL-XL) 100 MG 24 hr tablet Take 1 tablet (100 mg) by mouth daily 90 tablet 1     omeprazole (PRILOSEC) 40 MG DR capsule Take 1 capsule (40 mg) by mouth daily Take 30-60 minutes before a meal. 90 capsule 1     Allergies   Allergen Reactions     No Known Drug Allergy      FAMILY HISTORY NOTED AND REVIEWED    REVIEW OF SYSTEMS: denies fever, cough, sore throat or purulent drainage.    PHYSICAL EXAM:    BP (!) 148/88 (BP Location: Right arm, Cuff Size: Adult Large)   Pulse 60   Ht 1.93 m (6' 4\")   Wt 147.4 kg (325 lb)   LMP 09/06/2012   SpO2 98%   BMI 39.56 kg/m      Patient appears non toxic  R ear: canal free of cerumen. TM pearly grey  L ear: cerumen obscuring TM. Warm water irrig done.  Throat: no erythema or exudates  Nose: swollen turbinates, no drainage  Neck: supple without LA  Lungs: CTA bilat  Heart: RRR    Assessment and Plan:     (H61.22) Ceruminosis, left  (primary encounter diagnosis)  Comment:   Plan: irrig done successfully    (J31.0) Chronic rhinitis  Comment:   Plan: recd she start to use her flonase nasal spray regularly and recd trial of zyrtec 10mg every day for possible allergic rhinitis.    (E66.01) Morbid obesity (H)  Comment:   Plan: wt loss discussed. She doesn't like vegetables, but agrees to cut down on chips and try to eat more fruit    (I10) HTN (hypertension), benign  Comment: elevated today. Recd she discontinue the diclofenac prescribed for her knee and switch to tylenol.  She thinks the cortisone shot helped. Asked her to monitor BP at work and f/u with readings.  Plan: Comprehensive metabolic panel, lisinopril         (ZESTRIL) 10 MG tablet            (M17.0) Primary osteoarthritis of both knees  Comment:   Plan: acetaminophen (TYLENOL) 500 MG " tablet            (E78.5) Hyperlipidemia with target LDL less than 130  Comment:   Plan: Lipid panel reflex to direct LDL Fasting,         atorvastatin (LIPITOR) 40 MG tablet            (F17.200) Tobacco use disorder  Comment:   Plan: smoking cessation advised. She declines chantix and nicotine patch      Yolette Prabhakar PA-C

## 2020-09-15 NOTE — PATIENT INSTRUCTIONS
[address]    Use Flonase for congestion and add Zyrtec 10mg daily    Stop diclofenac as that can raise your blood pressure.  Tylenol is a safer alternative.    Monitor your blood pressure at work if you can and send me the readings.

## 2020-09-16 LAB
ALBUMIN SERPL-MCNC: 3.7 G/DL (ref 3.4–5)
ALP SERPL-CCNC: 124 U/L (ref 40–150)
ALT SERPL W P-5'-P-CCNC: 39 U/L (ref 0–50)
ANION GAP SERPL CALCULATED.3IONS-SCNC: 7 MMOL/L (ref 3–14)
AST SERPL W P-5'-P-CCNC: 29 U/L (ref 0–45)
BILIRUB SERPL-MCNC: 0.3 MG/DL (ref 0.2–1.3)
BUN SERPL-MCNC: 11 MG/DL (ref 7–30)
CALCIUM SERPL-MCNC: 8.9 MG/DL (ref 8.5–10.1)
CHLORIDE SERPL-SCNC: 104 MMOL/L (ref 94–109)
CHOLEST SERPL-MCNC: 235 MG/DL
CO2 SERPL-SCNC: 26 MMOL/L (ref 20–32)
CREAT SERPL-MCNC: 0.75 MG/DL (ref 0.52–1.04)
GFR SERPL CREATININE-BSD FRML MDRD: 87 ML/MIN/{1.73_M2}
GLUCOSE SERPL-MCNC: 90 MG/DL (ref 70–99)
HDLC SERPL-MCNC: 48 MG/DL
LDLC SERPL CALC-MCNC: 156 MG/DL
NONHDLC SERPL-MCNC: 187 MG/DL
POTASSIUM SERPL-SCNC: 4.4 MMOL/L (ref 3.4–5.3)
PROT SERPL-MCNC: 7.5 G/DL (ref 6.8–8.8)
SODIUM SERPL-SCNC: 137 MMOL/L (ref 133–144)
TRIGL SERPL-MCNC: 154 MG/DL

## 2020-09-22 NOTE — RESULT ENCOUNTER NOTE
Call pt and see if taking lipitor as her cholesterol is high  If she is taking lipitor 40mg and compliant with this, I will need to increase dose so let me know  Also recd low fat/low cholesterol diet    Glucose, electrolytes, kidney and liver functions normal

## 2020-09-29 ENCOUNTER — TELEPHONE (OUTPATIENT)
Dept: ORTHOPEDICS | Facility: CLINIC | Age: 60
End: 2020-09-29

## 2020-10-28 ENCOUNTER — TELEPHONE (OUTPATIENT)
Dept: FAMILY MEDICINE | Facility: CLINIC | Age: 60
End: 2020-10-28

## 2020-10-28 DIAGNOSIS — J32.9 CHRONIC SINUSITIS, UNSPECIFIED LOCATION: Primary | ICD-10-CM

## 2020-10-28 RX ORDER — AMOXICILLIN 500 MG/1
500 CAPSULE ORAL 3 TIMES DAILY
Qty: 30 CAPSULE | Refills: 0 | Status: SHIPPED | OUTPATIENT
Start: 2020-10-28 | End: 2020-12-09

## 2020-10-28 NOTE — TELEPHONE ENCOUNTER
Reason for call:  Symptom   Symptom or request: Patient is requesting an antibiotic or medication of some kind be prescribed for possible sinus infection with plugged ears.    Duration (how long have symptoms been present): 3-4 days  Have you been treated for this before? Yes    Additional comments:     Phone number to reach patient:  Cell number on file:    Telephone Information:   Mobile [phone]       Best Time:  Anytime    Can we leave a detailed message on this number?  Not Applicable    Travel screening: Not Applicable

## 2020-10-29 NOTE — TELEPHONE ENCOUNTER
LVM that a prescription is sent to her pharmacy. To call us back with any questions.       Cortez Ca CMA on 10/29/2020 at 7:27 AM

## 2020-12-04 DIAGNOSIS — I10 HTN (HYPERTENSION), BENIGN: ICD-10-CM

## 2020-12-04 NOTE — TELEPHONE ENCOUNTER
LOV 9- , follow up ONE month - not done  Patient also needs to repeat fasting labs after restarting lipid medication (see lab result note).    #30 R0 pended with SIG & Pharm note    RT Rex (R)

## 2020-12-07 RX ORDER — METOPROLOL SUCCINATE 100 MG/1
100 TABLET, EXTENDED RELEASE ORAL DAILY
Qty: 30 TABLET | Refills: 0 | Status: SHIPPED | OUTPATIENT
Start: 2020-12-07 | End: 2021-01-07

## 2020-12-08 ENCOUNTER — TELEPHONE (OUTPATIENT)
Dept: FAMILY MEDICINE | Facility: CLINIC | Age: 60
End: 2020-12-08

## 2020-12-08 NOTE — TELEPHONE ENCOUNTER
Medication Question or Refill    Who is calling: Molly    What medication are you calling about (include dose and sig)?: Cough syrup with codeine , pt stated she has had the cough a  few days and sinus drainage    Controlled Substance Agreement on file:     Who prescribed the medication?:     Do you need a refill? Yes:     When did you use the medication last?     Patient offered an appointment? No    Do you have any questions or concerns?  No    Requested Pharmacy: [facility] [address]    Okay to leave a detailed message?: Yes at Cell number on file:    Telephone Information:   Mobile [phone]

## 2020-12-09 ENCOUNTER — TRANSFERRED RECORDS (OUTPATIENT)
Dept: HEALTH INFORMATION MANAGEMENT | Facility: CLINIC | Age: 60
End: 2020-12-09

## 2020-12-09 NOTE — TELEPHONE ENCOUNTER
Left detailed message on patient's voice mail (permission per patient during incoming call to clinic)     Advised patient to either return call to triage, or schedule Virtual Visit or EVisit with a provider to assess symptoms and treat prn.         Mag SHARMA RN,BSN

## 2020-12-10 ENCOUNTER — VIRTUAL VISIT (OUTPATIENT)
Dept: FAMILY MEDICINE | Facility: CLINIC | Age: 60
End: 2020-12-10
Payer: COMMERCIAL

## 2020-12-10 DIAGNOSIS — Z20.822 SUSPECTED COVID-19 VIRUS INFECTION: Primary | ICD-10-CM

## 2020-12-10 PROCEDURE — 99213 OFFICE O/P EST LOW 20 MIN: CPT | Mod: 95 | Performed by: NURSE PRACTITIONER

## 2020-12-10 NOTE — PROGRESS NOTES
"Erlinda Aldridge is a 60 year old female who is being evaluated via a billable telephone visit.      The patient has been notified of following:     \"This telephone visit will be conducted via a call between you and your physician/provider. We have found that certain health care needs can be provided without the need for a physical exam.  This service lets us provide the care you need with a short phone conversation.  If a prescription is necessary we can send it directly to your pharmacy.  If lab work is needed we can place an order for that and you can then stop by our lab to have the test done at a later time.    Telephone visits are billed at different rates depending on your insurance coverage. During this emergency period, for some insurers they may be billed the same as an in-person visit.  Please reach out to your insurance provider with any questions.    If during the course of the call the physician/provider feels a telephone visit is not appropriate, you will not be charged for this service.\"    Patient has given verbal consent for Telephone visit?  Yes    What phone number would you like to be contacted at? [phone]    How would you like to obtain your AVS? Mail a copy    Subjective     Erlinda Aldridge is a 60 year old female who presents via phone visit today for the following health issues:    HPI     Pt c/o cough x4 days; she claims that it is all sinus, but patient sounds very congested;  Would like some cough medicine .  Has Hx of chronic sinusitis and Yolette sends Abx when needed.  Has not been tested for covid  BP yesterday was a little high but had a headache when it was checked   Thinks this is just a cold   She works at the hospital in housekeeping       Past Medical History:   Diagnosis Date     DVT (deep venous thrombosis) (H) 9/2012     HTN (hypertension), benign      Thrombosis of leg     Sept 2012     Uterine fibroid      Family History   Problem Relation Age of Onset     Cerebrovascular " Disease Mother         alive age 75     Hypertension Mother      Cancer Father         ? type     Hypertension Father         alive age 83     Hypertension Sister      Past Surgical History:   Procedure Laterality Date     HYSTERECTOMY TOTAL ABDOMINAL  1/2/2013    Procedure: HYSTERECTOMY TOTAL ABDOMINAL;  TOTAL ABDOMINAL HYSTERECTOMY, multiple myomecty, right salpinectomy lysis of adhesions ;  Surgeon: Tariq Brewer MD;  Location:  OR     HYSTERECTOMY, PAP NO LONGER INDICATED       MYOMECTOMY UTERUS  1/2/2013    Procedure: MYOMECTOMY UTERUS;;  Surgeon: Tariq Brewer MD;  Location:  OR     TONSILLECTOMY       wisdom teeth       Social History     Tobacco Use     Smoking status: Current Every Day Smoker     Packs/day: 0.50     Smokeless tobacco: Never Used   Substance Use Topics     Alcohol use: Yes     Alcohol/week: 0.0 standard drinks     Comment: occ       Current Outpatient Medications   Medication Sig Dispense Refill     acetaminophen (TYLENOL) 500 MG tablet Take 2 tablets (1,000 mg) by mouth every 6 hours as needed for mild pain 50 tablet 0     atorvastatin (LIPITOR) 40 MG tablet Take 1 tablet (40 mg) by mouth daily 90 tablet 1     COMPRESSION STOCKINGS 22-30mmHg, knee high stockings, wear as directed 2 each 1     fluticasone (FLONASE) 50 MCG/ACT spray Spray 1-2 sprays into both nostrils daily 1 Bottle 11     lisinopril (ZESTRIL) 10 MG tablet Take 1 tablet (10 mg) by mouth daily 90 tablet 1     metoprolol succinate ER (TOPROL-XL) 100 MG 24 hr tablet Take 1 tablet (100 mg) by mouth daily - follow up due for blood pressure check 30 tablet 0     omeprazole (PRILOSEC) 40 MG DR capsule Take 1 capsule (40 mg) by mouth daily Take 30-60 minutes before a meal. 90 capsule 1     Allergies   Allergen Reactions     No Known Drug Allergy        Reviewed and updated as needed this visit by clinical staff and provider        Review of Systems   Detailed as above        Objective          Vitals:healthy,  alert and no distressNo vitals were obtained today due to virtual visit.    healthy, alert and no distress  PSYCH: Alert and oriented times 3; coherent speech, normal   rate and volume, able to articulate logical thoughts, able   to abstract reason, no tangential thoughts, no hallucinations   or delusions  Her affect is normal  RESP: No cough, no audible wheezing, able to talk in full sentences  Remainder of exam unable to be completed due to telephone visits          Assessment/Plan:    Assessment & Plan     Suspected COVID-19 virus infection  Testing ordered. Instructed to keep quarantined for 14 days. Call her work Occ health. Follow-up with worsening symptoms   - Symptomatic COVID-19 Virus (Coronavirus) by PCR; Future         No follow-ups on file.    [facility]    Phone call duration:  12 minutes

## 2020-12-11 ENCOUNTER — PATIENT OUTREACH (OUTPATIENT)
Dept: NURSING | Facility: CLINIC | Age: 60
End: 2020-12-11
Payer: COMMERCIAL

## 2020-12-11 ENCOUNTER — HOSPITAL ENCOUNTER (EMERGENCY)
Facility: CLINIC | Age: 60
Discharge: HOME | End: 2020-12-11
Attending: EMERGENCY MEDICINE | Admitting: EMERGENCY MEDICINE
Payer: COMMERCIAL

## 2020-12-11 VITALS
RESPIRATION RATE: 18 BRPM | SYSTOLIC BLOOD PRESSURE: 147 MMHG | OXYGEN SATURATION: 100 % | DIASTOLIC BLOOD PRESSURE: 90 MMHG | HEART RATE: 76 BPM | TEMPERATURE: 98.4 F

## 2020-12-11 DIAGNOSIS — Z71.89 OTHER SPECIFIED COUNSELING: ICD-10-CM

## 2020-12-11 DIAGNOSIS — Z20.828 CONTACT WITH AND (SUSPECTED) EXPOSURE TO OTHER VIRAL COMMUNICABLE DISEASES: ICD-10-CM

## 2020-12-11 DIAGNOSIS — J06.9 VIRAL URI WITH COUGH: ICD-10-CM

## 2020-12-11 DIAGNOSIS — Z20.822 SUSPECTED COVID-19 VIRUS INFECTION: Primary | ICD-10-CM

## 2020-12-11 DIAGNOSIS — Z20.822 SUSPECTED 2019 NOVEL CORONAVIRUS INFECTION: ICD-10-CM

## 2020-12-11 LAB
LABORATORY COMMENT REPORT: NORMAL
SARS-COV-2 RNA SPEC QL NAA+PROBE: NEGATIVE
SARS-COV-2 RNA SPEC QL NAA+PROBE: NORMAL
SPECIMEN SOURCE: NORMAL
SPECIMEN SOURCE: NORMAL

## 2020-12-11 PROCEDURE — C9803 HOPD COVID-19 SPEC COLLECT: HCPCS | Performed by: EMERGENCY MEDICINE

## 2020-12-11 PROCEDURE — 99283 EMERGENCY DEPT VISIT LOW MDM: CPT | Performed by: EMERGENCY MEDICINE

## 2020-12-11 PROCEDURE — U0003 INFECTIOUS AGENT DETECTION BY NUCLEIC ACID (DNA OR RNA); SEVERE ACUTE RESPIRATORY SYNDROME CORONAVIRUS 2 (SARS-COV-2) (CORONAVIRUS DISEASE [COVID-19]), AMPLIFIED PROBE TECHNIQUE, MAKING USE OF HIGH THROUGHPUT TECHNOLOGIES AS DESCRIBED BY CMS-2020-01-R: HCPCS | Performed by: EMERGENCY MEDICINE

## 2020-12-11 PROCEDURE — 99284 EMERGENCY DEPT VISIT MOD MDM: CPT | Performed by: EMERGENCY MEDICINE

## 2020-12-11 RX ORDER — BENZONATATE 100 MG/1
100 CAPSULE ORAL 3 TIMES DAILY PRN
Qty: 20 CAPSULE | Refills: 0 | Status: SHIPPED | OUTPATIENT
Start: 2020-12-11 | End: 2021-08-24

## 2020-12-11 RX ORDER — GUAIFENESIN/DEXTROMETHORPHAN 100-10MG/5
5 SYRUP ORAL EVERY 4 HOURS PRN
Qty: 473 ML | Refills: 0 | Status: SHIPPED | OUTPATIENT
Start: 2020-12-11 | End: 2021-01-28

## 2020-12-11 NOTE — LETTER
December 11, 2020      To Whom It May Concern:      Erlinda Aldridge was seen in our Emergency Department in the early morning 12/11/20.  She should not return to work until COVID-19 swab has resulted and cleared by employee health.       Sincerely,        Grabiel Kebede MD  Emergency Medicine

## 2020-12-11 NOTE — ED AVS SNAPSHOT
[facility]  [address]  [place] [address]  Phone: [phone]  Fax: [phone]                                    Erlinda Aldridge   MRN: [id number]    Department: [facility]   Date of Visit: 12/11/2020           After Visit Summary Signature Page    I have received my discharge instructions, and my questions have been answered. I have discussed any challenges I see with this plan with the nurse or doctor.    ..........................................................................................................................................  Patient/Patient Representative Signature      ..........................................................................................................................................  Patient Representative Print Name and Relationship to Patient    ..................................................               ................................................  Date                                   Time    ..........................................................................................................................................  Reviewed by Signature/Title    ...................................................              ..............................................  Date                                               Time          22EPIC Rev 08/18

## 2020-12-11 NOTE — DISCHARGE INSTRUCTIONS
Your COVID-19 swab is in process and will likely result this coming day. Results can be found in [employer]. You should also receive a call if it is positive. Continue to self isolate in the meantime while symptomatic.     Please call Employee Health Services (phone: [phone]) prior to returning to work.      Return to the ED if you are having difficulty breathing, worsening symptoms, or any urgent/life-threatening concerns.

## 2020-12-11 NOTE — ED PROVIDER NOTES
History     Chief Complaint   Patient presents with     Cough     HPI  Erlinda Aldridge is a 60 year old female with PMH notable for DVT who presents to the ED with cough. Patient reports symptoms started about 1 week ago. She had nasal congestion, sinus headache, cough. Past 3 days only the cough remains. Not short of breath, no fever. She has been using guaifenesin. Patient has been self isolating, has not had a COVID-19 test. Patient asking about going back to work this coming evening. She also desires medication to help with the cough. She feels otherwise well now other than the cough.     Past Medical History  Past Medical History:   Diagnosis Date     DVT (deep venous thrombosis) (H) 9/2012     HTN (hypertension), benign      Thrombosis of leg     Sept 2012     Uterine fibroid      Past Surgical History:   Procedure Laterality Date     HYSTERECTOMY TOTAL ABDOMINAL  1/2/2013    Procedure: HYSTERECTOMY TOTAL ABDOMINAL;  TOTAL ABDOMINAL HYSTERECTOMY, multiple myomecty, right salpinectomy lysis of adhesions ;  Surgeon: Tariq Brewer MD;  Location:  OR     HYSTERECTOMY, PAP NO LONGER INDICATED       MYOMECTOMY UTERUS  1/2/2013    Procedure: MYOMECTOMY UTERUS;;  Surgeon: Tariq Brewer MD;  Location:  OR     TONSILLECTOMY       wisdom teeth            atorvastatin (LIPITOR) 40 MG tablet       benzonatate (TESSALON) 100 MG capsule       guaiFENesin-dextromethorphan (ROBITUSSIN DM) 100-10 MG/5ML syrup       lisinopril (ZESTRIL) 10 MG tablet       metoprolol succinate ER (TOPROL-XL) 100 MG 24 hr tablet       acetaminophen (TYLENOL) 500 MG tablet       COMPRESSION STOCKINGS       fluticasone (FLONASE) 50 MCG/ACT spray       omeprazole (PRILOSEC) 40 MG DR capsule      Allergies   Allergen Reactions     No Known Drug Allergy      Family History  Family History   Problem Relation Age of Onset     Cerebrovascular Disease Mother         alive age 75     Hypertension Mother      Cancer Father         ?  type     Hypertension Father         alive age 83     Hypertension Sister      Social History   Social History     Tobacco Use     Smoking status: Current Every Day Smoker     Packs/day: 0.50     Smokeless tobacco: Never Used   Substance Use Topics     Alcohol use: Yes     Alcohol/week: 0.0 standard drinks     Comment: occ       Drug use: No      Past medical history, past surgical history, medications, allergies, family history, and social history were reviewed with the patient. No additional pertinent items.      Review of Systems  A complete review of systems was performed with pertinent positives and negatives noted in the HPI, and all other systems negative.    Physical Exam   BP: (!) 147/90  Pulse: 76  Temp: 98.4  F (36.9  C)  Resp: 18  SpO2: 100 %    Physical Exam  General: no acute distress. Appears stated age.   HENT: MMM, no oropharyngeal lesions  Eyes: PERRL, normal sclerae  Cardio: regular rate. Regular rhythm. Extremities well perfused  Resp: Normal work of breathing, normal rate  Neuro: alert and fully oriented. CN II-XII grossly intact. Grossly normal strength and sensation in all extremities.   MSK: no deformities.   Integumentary/Skin: no rash visualized, normal color  Psych: normal affect, normal behavior    ED Course      Procedures           Labs Ordered and Resulted from Time of ED Arrival Up to the Time of Departure from the ED   COVID-19 VIRUS (CORONAVIRUS) BY PCR     No orders to display          Assessments & Plan (with Medical Decision Making)   Patient presenting with cough lingering after onset of viral URI symptoms for the past week. Vitals in the ED unremarkable, SpO2 100% and normal work of breathing. Nursing notes reviewed.     COVID-19 swab sent. Patient overall feeling well besides lingering cough. Low suspicion of bacterial pneumonia.     The complete clinical picture is most consistent with viral URI, possibly covid-19. After counseling on the diagnosis, work-up, and treatment  plan, the patient was discharged to home. The patient was advised to follow-up with employee health for clearance prior to return to work at [employer]. The patient was advised to return to the ED if worsening symptoms, difficulty breathing, or if there are any urgent/life-threatening concerns.     Final diagnoses:   Viral URI with cough     New Prescriptions    BENZONATATE (TESSALON) 100 MG CAPSULE    Take 1 capsule (100 mg) by mouth 3 times daily as needed (cough or throat pain)    GUAIFENESIN-DEXTROMETHORPHAN (ROBITUSSIN DM) 100-10 MG/5ML SYRUP    Take 5 mLs by mouth every 4 hours as needed for cough       --  Grabiel Kebede MD   Emergency Medicine   [facility] EMERGENCY DEPARTMENT  12/11/2020     Grabiel Kebede MD  12/11/20 035

## 2020-12-16 NOTE — TELEPHONE ENCOUNTER
Per review of Epic, patient was seen at the ED on 12/11/20  Ilana Newell RN on 12/16/2020 at 12:07 PM

## 2020-12-30 ENCOUNTER — TRANSFERRED RECORDS (OUTPATIENT)
Dept: HEALTH INFORMATION MANAGEMENT | Facility: CLINIC | Age: 60
End: 2020-12-30

## 2021-01-05 DIAGNOSIS — I10 HTN (HYPERTENSION), BENIGN: ICD-10-CM

## 2021-01-06 NOTE — TELEPHONE ENCOUNTER
METOPROLOL  MG TABLETS    Summary: Take 1 tablet (100 mg) by mouth daily - follow up due for blood pressure check, Disp-30 tablet, R-0, E-Prescribe  Please advise patient that an office visit is due with Yolette Prabhakar for a blood pressure check, recheck fasting labs     Dose, Route, Frequency: 100 mg, Oral, DAILY  Start: 12/7/2020  Ord/Sold: 12/7/2020

## 2021-01-07 RX ORDER — METOPROLOL SUCCINATE 100 MG/1
100 TABLET, EXTENDED RELEASE ORAL DAILY
Qty: 30 TABLET | Refills: 0 | Status: SHIPPED | OUTPATIENT
Start: 2021-01-07 | End: 2021-01-28

## 2021-01-07 NOTE — TELEPHONE ENCOUNTER
Called patient. Scheduled fasting appointment for patient with PCP 1/28/21.   Prescription approved per [facility] Refill Protocol.    Mag SHARMA RN,BSN

## 2021-01-28 ENCOUNTER — OFFICE VISIT (OUTPATIENT)
Dept: FAMILY MEDICINE | Facility: CLINIC | Age: 61
End: 2021-01-28
Payer: COMMERCIAL

## 2021-01-28 VITALS
DIASTOLIC BLOOD PRESSURE: 91 MMHG | BODY MASS INDEX: 39.68 KG/M2 | OXYGEN SATURATION: 96 % | TEMPERATURE: 96.6 F | WEIGHT: 293 LBS | SYSTOLIC BLOOD PRESSURE: 162 MMHG | HEIGHT: 72 IN | HEART RATE: 78 BPM

## 2021-01-28 DIAGNOSIS — E78.5 HYPERLIPIDEMIA WITH TARGET LDL LESS THAN 130: Primary | ICD-10-CM

## 2021-01-28 DIAGNOSIS — I10 HTN (HYPERTENSION), BENIGN: ICD-10-CM

## 2021-01-28 DIAGNOSIS — R09.81 NASAL CONGESTION: ICD-10-CM

## 2021-01-28 PROCEDURE — 80048 BASIC METABOLIC PNL TOTAL CA: CPT | Performed by: PHYSICIAN ASSISTANT

## 2021-01-28 PROCEDURE — 84460 ALANINE AMINO (ALT) (SGPT): CPT | Performed by: PHYSICIAN ASSISTANT

## 2021-01-28 PROCEDURE — 80061 LIPID PANEL: CPT | Performed by: PHYSICIAN ASSISTANT

## 2021-01-28 PROCEDURE — 99213 OFFICE O/P EST LOW 20 MIN: CPT | Performed by: PHYSICIAN ASSISTANT

## 2021-01-28 PROCEDURE — 36415 COLL VENOUS BLD VENIPUNCTURE: CPT | Performed by: PHYSICIAN ASSISTANT

## 2021-01-28 RX ORDER — METOPROLOL SUCCINATE 100 MG/1
100 TABLET, EXTENDED RELEASE ORAL DAILY
Qty: 90 TABLET | Refills: 1 | Status: SHIPPED | OUTPATIENT
Start: 2021-01-28 | End: 2021-07-28

## 2021-01-28 RX ORDER — FLUTICASONE PROPIONATE 50 MCG
1-2 SPRAY, SUSPENSION (ML) NASAL DAILY
Qty: 16 G | Refills: 1 | Status: SHIPPED | OUTPATIENT
Start: 2021-01-28 | End: 2021-12-10

## 2021-01-28 RX ORDER — LISINOPRIL 10 MG/1
10 TABLET ORAL DAILY
Qty: 90 TABLET | Refills: 1 | Status: SHIPPED | OUTPATIENT
Start: 2021-01-28 | End: 2021-07-28

## 2021-01-28 ASSESSMENT — MIFFLIN-ST. JEOR: SCORE: 2274.59

## 2021-01-28 NOTE — PROGRESS NOTES
HPI: Molly is a 59 yo female here for f/u HTN  BP very high today but she ran out of lisinopril a few weeks ago  Her father recently  of covid (he was 90 but very healthy prior to the infection)  She just returned from the  in [place]    She is having sinus fullness/pressure and prone to sinus infections x 2-3 days  She tried mucinex and something for her sinuses      Past Medical History:   Diagnosis Date     DVT (deep venous thrombosis) (H) 2012     HTN (hypertension), benign      Thrombosis of leg     2012     Uterine fibroid      Past Surgical History:   Procedure Laterality Date     HYSTERECTOMY TOTAL ABDOMINAL  2013    Procedure: HYSTERECTOMY TOTAL ABDOMINAL;  TOTAL ABDOMINAL HYSTERECTOMY, multiple myomecty, right salpinectomy lysis of adhesions ;  Surgeon: Tariq Brewer MD;  Location: [facility] OR     HYSTERECTOMY, PAP NO LONGER INDICATED       MYOMECTOMY UTERUS  2013    Procedure: MYOMECTOMY UTERUS;;  Surgeon: Tariq Brewer MD;  Location: [facility] OR     TONSILLECTOMY       wisdom teeth       Social History     Tobacco Use     Smoking status: Current Every Day Smoker     Packs/day: 0.50     Smokeless tobacco: Never Used   Substance Use Topics     Alcohol use: Yes     Alcohol/week: 0.0 standard drinks     Comment: occ       Current Outpatient Medications   Medication Sig Dispense Refill     acetaminophen (TYLENOL) 500 MG tablet Take 2 tablets (1,000 mg) by mouth every 6 hours as needed for mild pain 50 tablet 0     atorvastatin (LIPITOR) 40 MG tablet Take 1 tablet (40 mg) by mouth daily 90 tablet 1     benzonatate (TESSALON) 100 MG capsule Take 1 capsule (100 mg) by mouth 3 times daily as needed (cough or throat pain) 20 capsule 0     fluticasone (FLONASE) 50 MCG/ACT nasal spray Spray 1-2 sprays into both nostrils daily 16 g 1     lisinopril (ZESTRIL) 10 MG tablet Take 1 tablet (10 mg) by mouth daily 90 tablet 1     metoprolol succinate ER (TOPROL-XL) 100 MG 24 hr tablet Take 1  "tablet (100 mg) by mouth daily 90 tablet 1     omeprazole (PRILOSEC) 40 MG DR capsule Take 1 capsule (40 mg) by mouth daily Take 30-60 minutes before a meal. 90 capsule 1     COMPRESSION STOCKINGS 22-30mmHg, knee high stockings, wear as directed 2 each 1     Allergies   Allergen Reactions     No Known Drug Allergy      FAMILY HISTORY NOTED AND REVIEWED      PHYSICAL EXAM:    BP (!) 162/91 (BP Location: Right arm, Patient Position: Chair, Cuff Size: Adult Large)   Pulse 78   Temp 96.6  F (35.9  C) (Temporal)   Ht 1.93 m (6' 4\")   Wt (!) 152.4 kg (336 lb)   LMP 09/06/2012   SpO2 96%   BMI 40.90 kg/m      Patient appears non toxic  Psych: pt tearful at times  Heart: RRR    Assessment and Plan:     (E78.5) Hyperlipidemia with target LDL less than 130  (primary encounter diagnosis)  Comment: she is taking lipitor so will see what effect that had  Plan: Lipid panel reflex to direct LDL Fasting, ALT            (I10) HTN (hypertension), benign  Comment: uncontrolled, noncompliant with meds. Get back on lisinopril (ran out a few weeks ago), cont metoprolol, avoid cold meds with sudafed. F/u 2 weeks for HTN f/u  Plan: metoprolol succinate ER (TOPROL-XL) 100 MG 24         hr tablet, lisinopril (ZESTRIL) 10 MG tablet,         Basic metabolic panel            (R09.81) Nasal congestion  Comment: worse since her flight 2 days ago. Restart flonase and f/u if sxs persist.  Plan: fluticasone (FLONASE) 50 MCG/ACT nasal spray              Yolette Prabhakar PA-C        "

## 2021-01-28 NOTE — LETTER
January 29, 2021      Erlinda Aldridge  [address]   [facility] [address]          Molly,     Your cholesterol profile is MUCH improved on the lipitor 40mg.   Your total cholesterol went from 235 to 154 so that is great.   Your blood sugar, electrolytes, liver and kidney function tests were normal.     Yolette Prabhakar PA-C    Resulted Orders   Lipid panel reflex to direct LDL Fasting   Result Value Ref Range    Cholesterol 154 <200 mg/dL    Triglycerides 86 <150 mg/dL    HDL Cholesterol 52 >49 mg/dL    LDL Cholesterol Calculated 85 <100 mg/dL      Comment:      Desirable:       <100 mg/dl    Non HDL Cholesterol 102 <130 mg/dL   ALT   Result Value Ref Range    ALT 36 0 - 50 U/L   Basic metabolic panel   Result Value Ref Range    Sodium 139 133 - 144 mmol/L    Potassium 3.9 3.4 - 5.3 mmol/L    Chloride 108 94 - 109 mmol/L    Carbon Dioxide 24 20 - 32 mmol/L    Anion Gap 7 3 - 14 mmol/L    Glucose 89 70 - 99 mg/dL    Urea Nitrogen 12 7 - 30 mg/dL    Creatinine 0.61 0.52 - 1.04 mg/dL    GFR Estimate >90 >60 mL/min/[1.73_m2]      Comment:      Non  GFR Calc  Starting 12/18/2018, serum creatinine based estimated GFR (eGFR) will be   calculated using the Chronic Kidney Disease Epidemiology Collaboration   (CKD-EPI) equation.      GFR Estimate If Black >90 >60 mL/min/[1.73_m2]      Comment:       GFR Calc  Starting 12/18/2018, serum creatinine based estimated GFR (eGFR) will be   calculated using the Chronic Kidney Disease Epidemiology Collaboration   (CKD-EPI) equation.      Calcium 9.3 8.5 - 10.1 mg/dL

## 2021-01-28 NOTE — PATIENT INSTRUCTIONS
AVOID MEDICATIONS CONTAINING SUDAFED (PSEUDOPHEDRINE)    Tylenol, mucinex, milton DM all okay    Nasal saline is fine    I refilled your Flonase nasal spray

## 2021-01-29 LAB
ALT SERPL W P-5'-P-CCNC: 36 U/L (ref 0–50)
ANION GAP SERPL CALCULATED.3IONS-SCNC: 7 MMOL/L (ref 3–14)
BUN SERPL-MCNC: 12 MG/DL (ref 7–30)
CALCIUM SERPL-MCNC: 9.3 MG/DL (ref 8.5–10.1)
CHLORIDE SERPL-SCNC: 108 MMOL/L (ref 94–109)
CHOLEST SERPL-MCNC: 154 MG/DL
CO2 SERPL-SCNC: 24 MMOL/L (ref 20–32)
CREAT SERPL-MCNC: 0.61 MG/DL (ref 0.52–1.04)
GFR SERPL CREATININE-BSD FRML MDRD: >90 ML/MIN/{1.73_M2}
GLUCOSE SERPL-MCNC: 89 MG/DL (ref 70–99)
HDLC SERPL-MCNC: 52 MG/DL
LDLC SERPL CALC-MCNC: 85 MG/DL
NONHDLC SERPL-MCNC: 102 MG/DL
POTASSIUM SERPL-SCNC: 3.9 MMOL/L (ref 3.4–5.3)
SODIUM SERPL-SCNC: 139 MMOL/L (ref 133–144)
TRIGL SERPL-MCNC: 86 MG/DL

## 2021-01-29 NOTE — RESULT ENCOUNTER NOTE
Molly,    Your cholesterol profile is MUCH improved on the lipitor 40mg.  Your total cholesterol went from 235 to 154 so that is great.  Your blood sugar, electrolytes, liver and kidney function tests were normal.    Yolette Prabhakar PA-C

## 2021-02-24 ENCOUNTER — DOCUMENTATION ONLY (OUTPATIENT)
Dept: FAMILY MEDICINE | Facility: CLINIC | Age: 61
End: 2021-02-24

## 2021-02-24 NOTE — PROGRESS NOTES
I called pt as she was not coming to appt and she notes she forgot she had appt. She notes she has been taking her BP medication and recent reading at work was 130/76. Recd she reschedule and bring in more readings.  She is having trouble with MyChart so recd she call the customer support number.

## 2021-02-27 DIAGNOSIS — K21.9 GASTROESOPHAGEAL REFLUX DISEASE: Primary | ICD-10-CM

## 2021-02-28 RX ORDER — OMEPRAZOLE 40 MG/1
40 CAPSULE, DELAYED RELEASE ORAL DAILY
Qty: 90 CAPSULE | Refills: 3 | Status: SHIPPED | OUTPATIENT
Start: 2021-02-28 | End: 2021-08-24

## 2021-03-22 ENCOUNTER — TRANSFERRED RECORDS (OUTPATIENT)
Dept: HEALTH INFORMATION MANAGEMENT | Facility: CLINIC | Age: 61
End: 2021-03-22

## 2021-03-22 LAB
ALT SERPL-CCNC: 48 IU/L (ref 12–68)
AST SERPL-CCNC: 43 IU/L (ref 12–37)
CHOLEST SERPL-MCNC: 135 MG/DL
CREAT SERPL-MCNC: 0.72 MG/DL (ref 0.55–1.02)
GFR SERPL CREATININE-BSD FRML MDRD: >60 ML/MIN
GLUCOSE SERPL-MCNC: 95 MG/DL (ref 74–106)
HBA1C MFR BLD: 6.1 % (ref 0–5.7)
HDLC SERPL-MCNC: 54 MG/DL
LDLC SERPL CALC-MCNC: 65 MG/DL
POTASSIUM SERPL-SCNC: 4.2 MMOL/L (ref 3.5–5.1)
TRIGL SERPL-MCNC: 79 MG/DL

## 2021-03-23 ENCOUNTER — TRANSFERRED RECORDS (OUTPATIENT)
Dept: HEALTH INFORMATION MANAGEMENT | Facility: CLINIC | Age: 61
End: 2021-03-23

## 2021-05-28 DIAGNOSIS — E78.5 HYPERLIPIDEMIA WITH TARGET LDL LESS THAN 130: ICD-10-CM

## 2021-06-01 RX ORDER — ATORVASTATIN CALCIUM 40 MG/1
40 TABLET, FILM COATED ORAL DAILY
Qty: 90 TABLET | Refills: 2 | Status: SHIPPED | OUTPATIENT
Start: 2021-06-01 | End: 2022-02-08

## 2021-06-01 NOTE — TELEPHONE ENCOUNTER
Prescription approved per [facility] Refill Protocol.  Reanna Das, RN  [facility] RN Triage Team

## 2021-07-24 ENCOUNTER — HEALTH MAINTENANCE LETTER (OUTPATIENT)
Age: 61
End: 2021-07-24

## 2021-07-28 DIAGNOSIS — I10 HTN (HYPERTENSION), BENIGN: ICD-10-CM

## 2021-07-28 RX ORDER — METOPROLOL SUCCINATE 100 MG/1
TABLET, EXTENDED RELEASE ORAL
Qty: 90 TABLET | Refills: 0 | Status: SHIPPED | OUTPATIENT
Start: 2021-07-28 | End: 2021-08-24

## 2021-07-28 RX ORDER — LISINOPRIL 10 MG/1
TABLET ORAL
Qty: 90 TABLET | Refills: 0 | Status: SHIPPED | OUTPATIENT
Start: 2021-07-28 | End: 2021-08-24 | Stop reason: DRUGHIGH

## 2021-07-28 NOTE — TELEPHONE ENCOUNTER
Routing refill request to provider for review/approval because:  Drug not on the [facility] refill protocol due to blood pressure.     BP Readings from Last 3 Encounters:   01/28/21 (!) 162/91   12/11/20 (!) 147/90   09/15/20 (!) 148/88        Graciela Jimenez RN  [facility] Triage

## 2021-08-19 NOTE — TELEPHONE ENCOUNTER
"Left message on personal VM greeting. \"New medication called Metoprolol was ordered by your provider. This replaces Atenolol, which the pharmacy reports is on back order. Do not take both medications. The new medicine is called Metoprolol and the pharmacy will discuss this with you.\"  Ilana Newell RN    " injury.     Since our last visit patient has been engaged in massage therapy, chiropractic care, working on pit crew. States pain has been well controlled. Using 5 percocet 5/325 mg per week. Does have a lot of muscle tightness. Working with chiro/massage on this. Has been doing occasional mindfulness and finds it temporarily beneficial. Stopped all his mood medication as he did not feel they were helpin.     Pain located all over muscles and joints.   Described as aching/tightness.  NO bowel or bladder dysfunction.   NO numbness, tingling, or weakness.   Medication, massage, chiro makes it better.   Too much activity makes it worse.     PAIN LEVEL 5/10 (PREVIOUS 5/10)  BPI FUNCTIONAL INTERFERENCE SCORE 6/10       CURRENT MEDICAL ISSUES:  GERD, high cholesterol, migraines    PSYCHOSOCIAL HISTORY:  Employed: Yes;  for car racing/pit crew; very active job  Hobbies: , jn diving, car racing, training horses  Marital Status:   Children:none  Treated for addiction? : No  Ever used illegal drugs in last 5 years? : No  Smoker: NO  Major depression/ADHD/chronic insomnia/anxiety; working actively with therapist and PCP. Dealing with mid life crisis like issues. Defining who he is and what his life means. Does better with people, doesn't like being alone. Has a dog. Loves his job and identifies with it.   Depression/anxiety was treated with prozac 20 mg and cymbalta 60 mg, but he stopped this on his own as he felt it did not help his mood.   Insomnia treated with trazodone 100 mg qhs  ADHD treated with adderall /adderall XR     Goals: continue to be active in pit crew, do daily chores-meeting this goal    OPIOID THERAPY MONITORING:  Activity/ADLs (meeting functional goals): independent  Analgesia: 90%  Adverse events (constipation, nausea, dizziness, drowsiness): none  Aberrant behavior (requesting early refills, self-escalating doses, doctor shopping): none    Affect: varied and  appropriate    CONTROLLED SUBSTANCE RISK ASSESSMENT:  ORT 5  Any aberrant behavior on the PDMP?: no  Most recent UDS: 2021 appropriate  Overall opioid risk is deemed to be MODERATE.  Has patient been prescribed naloxone for home use (MME >50)? n/a  Opioid Consent/Agreement: on file  Current MME: 5    CURRENT MEDICATIONS  Per Epic Record.    TREATMENTS PREVIOUSLY TRIED:  Surgeries/Interventions-  None     Medications-  Gabapentin (after ankle surgery only)  Tramadol/percocet/morphine (after surgery)     Adjuvants-  PT (only after left ankle surgery)       PAIN MANAGEMENT PRESCRIBES THE FOLLOWING MEDICATIONS:  cymbalta 60 mg qd  norco 5-325 mg up to 5 tabs per week    ALLERGIES:  Reviewed.    ROS Negative except as listed in HPI.    PHYSICAL EXAM:  Visit Vitals  Ht 5' 4\" (1.626 m)   Wt 53.5 kg   BMI 20.25 kg/m²     General:  Alert, appropriate.  Does not appear somnolent nor intoxicated.  In no apparent distress.  HENT: Mucous membranes moist  Eyes: Pupils are not inappropriately constricted or dilated.  Conjunctivae pink.  Sclera anicteric  Neck:  Supple.    Respiratory: Respirations unlabored.   Peripheral Vascular:   No peripheral edema.  Integumentary:  Warm without rash  Musculoskeletal:  Sit to stand without issues  Neurologic:  Cn 2-12 gi    PERTINENT IMAGING REPORTS AND DIAGNOSTIC STUDIES:  No new studies to review today.

## 2021-08-24 ENCOUNTER — OFFICE VISIT (OUTPATIENT)
Dept: FAMILY MEDICINE | Facility: CLINIC | Age: 61
End: 2021-08-24
Payer: COMMERCIAL

## 2021-08-24 VITALS
TEMPERATURE: 97.1 F | BODY MASS INDEX: 39.68 KG/M2 | OXYGEN SATURATION: 95 % | WEIGHT: 293 LBS | HEART RATE: 60 BPM | HEIGHT: 72 IN | SYSTOLIC BLOOD PRESSURE: 162 MMHG | DIASTOLIC BLOOD PRESSURE: 89 MMHG | RESPIRATION RATE: 16 BRPM

## 2021-08-24 DIAGNOSIS — E66.01 MORBID OBESITY (H): ICD-10-CM

## 2021-08-24 DIAGNOSIS — K21.9 CHRONIC GERD: ICD-10-CM

## 2021-08-24 DIAGNOSIS — I82.4Z1 DEEP VEIN THROMBOSIS (DVT) OF DISTAL VEIN OF RIGHT LOWER EXTREMITY, UNSPECIFIED CHRONICITY (H): ICD-10-CM

## 2021-08-24 DIAGNOSIS — I10 HTN (HYPERTENSION), BENIGN: Primary | ICD-10-CM

## 2021-08-24 DIAGNOSIS — F17.200 TOBACCO USE DISORDER: ICD-10-CM

## 2021-08-24 PROCEDURE — 99214 OFFICE O/P EST MOD 30 MIN: CPT | Performed by: PHYSICIAN ASSISTANT

## 2021-08-24 RX ORDER — OMEPRAZOLE 40 MG/1
40 CAPSULE, DELAYED RELEASE ORAL DAILY
Qty: 90 CAPSULE | Refills: 3 | Status: SHIPPED | OUTPATIENT
Start: 2021-08-24 | End: 2022-02-08

## 2021-08-24 RX ORDER — METOPROLOL SUCCINATE 100 MG/1
100 TABLET, EXTENDED RELEASE ORAL DAILY
Qty: 90 TABLET | Refills: 3 | Status: SHIPPED | OUTPATIENT
Start: 2021-08-24 | End: 2022-02-08

## 2021-08-24 RX ORDER — CETIRIZINE HYDROCHLORIDE 10 MG/1
10 TABLET ORAL DAILY
COMMUNITY
Start: 2021-08-24 | End: 2024-01-09

## 2021-08-24 RX ORDER — LISINOPRIL 20 MG/1
20 TABLET ORAL DAILY
Qty: 90 TABLET | Refills: 3 | Status: SHIPPED | OUTPATIENT
Start: 2021-08-24 | End: 2022-02-08

## 2021-08-24 ASSESSMENT — MIFFLIN-ST. JEOR: SCORE: 2283.19

## 2021-08-24 NOTE — PROGRESS NOTES
HPI: Molly is a pleasant 60 yo female here for f/u HTN  She has finally been vaccinated for covid  She currently is smoking 1/2 ppd.  Declines lung ca screening test for now, but will think about it    She did see gyn in March and had labs including lipids, CMP, CBC    She has some chest congestion following her covid booster on 8/10/21.  She is sad as her father  of COVID last 2020      Past Medical History:   Diagnosis Date     DVT (deep venous thrombosis) (H) 2012     HTN (hypertension), benign      Thrombosis of leg     2012     Uterine fibroid      Past Surgical History:   Procedure Laterality Date     HYSTERECTOMY TOTAL ABDOMINAL  2013    Procedure: HYSTERECTOMY TOTAL ABDOMINAL;  TOTAL ABDOMINAL HYSTERECTOMY, multiple myomecty, right salpinectomy lysis of adhesions ;  Surgeon: Tariq Brewer MD;  Location:  OR     HYSTERECTOMY, PAP NO LONGER INDICATED       MYOMECTOMY UTERUS  2013    Procedure: MYOMECTOMY UTERUS;;  Surgeon: Tariq Brewer MD;  Location:  OR     TONSILLECTOMY       wisdom teeth       Social History     Tobacco Use     Smoking status: Current Every Day Smoker     Packs/day: 0.50     Smokeless tobacco: Never Used   Substance Use Topics     Alcohol use: Yes     Alcohol/week: 0.0 standard drinks     Comment: occ       Current Outpatient Medications   Medication Sig Dispense Refill     acetaminophen (TYLENOL) 500 MG tablet Take 2 tablets (1,000 mg) by mouth every 6 hours as needed for mild pain 50 tablet 0     atorvastatin (LIPITOR) 40 MG tablet Take 1 tablet (40 mg) by mouth daily 90 tablet 2     COMPRESSION STOCKINGS 22-30mmHg, knee high stockings, wear as directed 2 each 1     fluticasone (FLONASE) 50 MCG/ACT nasal spray Spray 1-2 sprays into both nostrils daily 16 g 1     lisinopril (ZESTRIL) 10 MG tablet TAKE 1 TABLET(10 MG) BY MOUTH DAILY 90 tablet 0     metoprolol succinate ER (TOPROL-XL) 100 MG 24 hr tablet TAKE 1 TABLET(100 MG) BY MOUTH DAILY 90  "tablet 0     omeprazole (PRILOSEC) 40 MG DR capsule Take 1 capsule (40 mg) by mouth daily Take 30-60 minutes before a meal. 90 capsule 3     Allergies   Allergen Reactions     No Known Drug Allergy      FAMILY HISTORY NOTED AND REVIEWED    PHYSICAL EXAM:    BP (!) 162/89 (BP Location: Left arm, Patient Position: Sitting, Cuff Size: Adult Large)   Pulse 60   Temp 97.1  F (36.2  C) (Temporal)   Resp 16   Ht 1.93 m (6' 4\")   Wt (!) 153.8 kg (339 lb)   LMP 09/06/2012   SpO2 95%   BMI 41.26 kg/m      Patient appears non toxic  Rechecked BP and it remained elevated.  Lungs: course wheezing on forced exhalations  Heart: RRR    Assessment and Plan:     (I10) HTN (hypertension), benign  (primary encounter diagnosis)  Comment: increased dose of lisinopril from 10to 20mg every day.  Recd she send me BP readings on MyChart in 2-3 weeks.  She had labs at gyn appt.  Plan: lisinopril (ZESTRIL) 20 MG tablet, metoprolol         succinate ER (TOPROL-XL) 100 MG 24 hr tablet            (E66.01) Morbid obesity (H)  Comment:   Plan: wt loss discussed    (F17.200) Tobacco use disorder  Comment:   Plan: smoking cessation discussed. She has used the patch in the past which helped so considering trying that again.    (I82.4Z1) Deep vein thrombosis (DVT) of distal vein of right lower extremity, unspecified chronicity (H)  Comment:   Plan: Compression Sleeve/Stocking Order for DME -         ONLY FOR DME            (K21.9) Chronic GERD  Comment:   Plan: omeprazole (PRILOSEC) 40 MG DR capsule        Refilled.      Yolette Prabhakar PA-C        "

## 2021-08-24 NOTE — Clinical Note
Please abstract the following data from this visit with this patient into the appropriate field in Epic:    Tests that can be patient reported without a hard copy:    Mammogram done on this date: 12/30/2020 (approximately), by this group: [facility]

## 2021-12-09 DIAGNOSIS — R09.81 NASAL CONGESTION: ICD-10-CM

## 2021-12-10 RX ORDER — FLUTICASONE PROPIONATE 50 MCG
SPRAY, SUSPENSION (ML) NASAL
Qty: 16 G | Refills: 1 | Status: SHIPPED | OUTPATIENT
Start: 2021-12-10 | End: 2023-11-16

## 2022-02-08 ENCOUNTER — OFFICE VISIT (OUTPATIENT)
Dept: FAMILY MEDICINE | Facility: CLINIC | Age: 62
End: 2022-02-08
Payer: COMMERCIAL

## 2022-02-08 VITALS
HEART RATE: 60 BPM | RESPIRATION RATE: 20 BRPM | TEMPERATURE: 97.3 F | OXYGEN SATURATION: 96 % | HEIGHT: 72 IN | WEIGHT: 293 LBS | BODY MASS INDEX: 39.68 KG/M2 | DIASTOLIC BLOOD PRESSURE: 78 MMHG | SYSTOLIC BLOOD PRESSURE: 154 MMHG

## 2022-02-08 DIAGNOSIS — I10 HTN (HYPERTENSION), BENIGN: Primary | ICD-10-CM

## 2022-02-08 DIAGNOSIS — Z23 NEED FOR VACCINATION: ICD-10-CM

## 2022-02-08 DIAGNOSIS — K21.9 CHRONIC GERD: ICD-10-CM

## 2022-02-08 DIAGNOSIS — E66.01 MORBID OBESITY (H): ICD-10-CM

## 2022-02-08 DIAGNOSIS — I82.4Z1 DEEP VEIN THROMBOSIS (DVT) OF DISTAL VEIN OF RIGHT LOWER EXTREMITY, UNSPECIFIED CHRONICITY (H): ICD-10-CM

## 2022-02-08 DIAGNOSIS — F17.200 TOBACCO USE DISORDER: ICD-10-CM

## 2022-02-08 DIAGNOSIS — R73.03 PREDIABETES: ICD-10-CM

## 2022-02-08 DIAGNOSIS — R63.5 WEIGHT GAIN: ICD-10-CM

## 2022-02-08 DIAGNOSIS — E78.5 HYPERLIPIDEMIA WITH TARGET LDL LESS THAN 130: ICD-10-CM

## 2022-02-08 LAB — HBA1C MFR BLD: 6.1 % (ref 0–5.6)

## 2022-02-08 PROCEDURE — 80061 LIPID PANEL: CPT | Performed by: PHYSICIAN ASSISTANT

## 2022-02-08 PROCEDURE — 99214 OFFICE O/P EST MOD 30 MIN: CPT | Mod: 25 | Performed by: PHYSICIAN ASSISTANT

## 2022-02-08 PROCEDURE — 90471 IMMUNIZATION ADMIN: CPT | Performed by: PHYSICIAN ASSISTANT

## 2022-02-08 PROCEDURE — 84443 ASSAY THYROID STIM HORMONE: CPT | Performed by: PHYSICIAN ASSISTANT

## 2022-02-08 PROCEDURE — 83036 HEMOGLOBIN GLYCOSYLATED A1C: CPT | Performed by: PHYSICIAN ASSISTANT

## 2022-02-08 PROCEDURE — 90715 TDAP VACCINE 7 YRS/> IM: CPT | Performed by: PHYSICIAN ASSISTANT

## 2022-02-08 PROCEDURE — 80053 COMPREHEN METABOLIC PANEL: CPT | Performed by: PHYSICIAN ASSISTANT

## 2022-02-08 PROCEDURE — 36415 COLL VENOUS BLD VENIPUNCTURE: CPT | Performed by: PHYSICIAN ASSISTANT

## 2022-02-08 RX ORDER — METOPROLOL SUCCINATE 100 MG/1
100 TABLET, EXTENDED RELEASE ORAL DAILY
Qty: 90 TABLET | Refills: 3 | Status: SHIPPED | OUTPATIENT
Start: 2022-02-08 | End: 2023-03-08

## 2022-02-08 RX ORDER — LISINOPRIL 20 MG/1
20 TABLET ORAL DAILY
Qty: 90 TABLET | Refills: 3 | Status: SHIPPED | OUTPATIENT
Start: 2022-02-08 | End: 2022-09-23

## 2022-02-08 RX ORDER — ATORVASTATIN CALCIUM 40 MG/1
40 TABLET, FILM COATED ORAL DAILY
Qty: 90 TABLET | Refills: 3 | Status: SHIPPED | OUTPATIENT
Start: 2022-02-08 | End: 2023-03-08

## 2022-02-08 RX ORDER — OMEPRAZOLE 40 MG/1
40 CAPSULE, DELAYED RELEASE ORAL DAILY
Qty: 90 CAPSULE | Refills: 3 | Status: SHIPPED | OUTPATIENT
Start: 2022-02-08 | End: 2023-03-08

## 2022-02-08 RX ORDER — NICOTINE 21 MG/24HR
1 PATCH, TRANSDERMAL 24 HOURS TRANSDERMAL EVERY 24 HOURS
Qty: 28 PATCH | Refills: 1 | Status: SHIPPED | OUTPATIENT
Start: 2022-02-08 | End: 2022-05-10

## 2022-02-08 ASSESSMENT — MIFFLIN-ST. JEOR: SCORE: 2369.38

## 2022-02-08 ASSESSMENT — PAIN SCALES - GENERAL: PAINLEVEL: NO PAIN (0)

## 2022-02-08 NOTE — NURSING NOTE
"  Initial BP:  BP (!) 154/78 (BP Location: Left arm, Patient Position: Sitting, Cuff Size: Adult Large)   Pulse 60   Temp 97.3  F (36.3  C) (Temporal)   Resp 20   Ht 1.93 m (6' 4\")   Wt (!) 162.4 kg (358 lb)   LMP 09/06/2012   SpO2 96%   BMI 43.58 kg/m       60  Disposition: provider notified while patient in the clinic    Komal Parsons CMA      "

## 2022-02-08 NOTE — PROGRESS NOTES
"HPI: Molly is a 62 yo female here for routine f/u of HTN, hyperlipidemia and hx of DVT  HTN: she does not check her BP on her own, but is compliant with medications  She has gained weight since last year but is motivated in trying to lose weight.  She is a  at [employer] and active at her job, but no formal exercise program.  Her A1c was checked by gyn last year and in the prediabetes range.  She will set up her mammogram at [facility] since that is due.  Currently smoking about 1/2 ppd and \"somewhat interested\" in quitting.  Willing to try the nicotine patch    She has hx of DVT and occas notes LE edema  She was given an order for knee high compression stockings but hasn't had time to order those.    Pt due to covid booster but ceclines for now as doesn't want to have the SE she had with her vaccine before.  Her mother has medical problems and lives in [place] which causes stress for pt.        Past Medical History:   Diagnosis Date     DVT (deep venous thrombosis) (H) 9/2012     HTN (hypertension), benign      Thrombosis of leg     Sept 2012     Uterine fibroid      Past Surgical History:   Procedure Laterality Date     HYSTERECTOMY TOTAL ABDOMINAL  1/2/2013    Procedure: HYSTERECTOMY TOTAL ABDOMINAL;  TOTAL ABDOMINAL HYSTERECTOMY, multiple myomecty, right salpinectomy lysis of adhesions ;  Surgeon: Tariq Brewer MD;  Location:  OR     HYSTERECTOMY, PAP NO LONGER INDICATED       MYOMECTOMY UTERUS  1/2/2013    Procedure: MYOMECTOMY UTERUS;;  Surgeon: Tariq Brewer MD;  Location:  OR     TONSILLECTOMY       wisdom teeth       Social History     Tobacco Use     Smoking status: Current Every Day Smoker     Packs/day: 0.50     Smokeless tobacco: Never Used   Substance Use Topics     Alcohol use: Yes     Alcohol/week: 0.0 standard drinks     Comment: occ       Current Outpatient Medications   Medication Sig Dispense Refill     acetaminophen (TYLENOL) 500 MG tablet Take 2 tablets (1,000 mg) by mouth " "every 6 hours as needed for mild pain 50 tablet 0     atorvastatin (LIPITOR) 40 MG tablet Take 1 tablet (40 mg) by mouth daily 90 tablet 3     cetirizine (ZYRTEC) 10 MG tablet Take 1 tablet (10 mg) by mouth daily       COMPRESSION STOCKINGS 22-30mmHg, knee high stockings, wear as directed 2 each 1     fluticasone (FLONASE) 50 MCG/ACT nasal spray SHAKE LIQUID AND USE 1 TO 2 SPRAYS IN EACH NOSTRIL DAILY 16 g 1     lisinopril (ZESTRIL) 20 MG tablet Take 1 tablet (20 mg) by mouth daily 90 tablet 3     metoprolol succinate ER (TOPROL-XL) 100 MG 24 hr tablet Take 1 tablet (100 mg) by mouth daily 90 tablet 3     nicotine (NICODERM CQ) 14 MG/24HR 24 hr patch Place 1 patch onto the skin every 24 hours 28 patch 1     omeprazole (PRILOSEC) 40 MG DR capsule Take 1 capsule (40 mg) by mouth daily Take 30-60 minutes before a meal. 90 capsule 3     Allergies   Allergen Reactions     No Known Drug Allergy      FAMILY HISTORY NOTED AND REVIEWED    PHYSICAL EXAM:    BP (!) 154/78 (BP Location: Left arm, Patient Position: Sitting, Cuff Size: Adult Large)   Pulse 60   Temp 97.3  F (36.3  C) (Temporal)   Resp 20   Ht 1.93 m (6' 4\")   Wt (!) 162.4 kg (358 lb)   LMP 09/06/2012   SpO2 96%   BMI 43.58 kg/m      Patient appears non toxic  Lungs: CTA bilat  Heart: RRR without m/r/g  Extr: trace nonpitting ankle edema.  Psych: approp affect and mood    Assessment and Plan:     (I10) HTN (hypertension), benign  (primary encounter diagnosis)  Comment: BP elevated in clinic today. Pt agrees to check her BP at work when possible. She has gained weight. Discussed decreasing calorie intake and increasing exercise.    Plan: metoprolol succinate ER (TOPROL-XL) 100 MG 24         hr tablet, lisinopril (ZESTRIL) 20 MG tablet,         Comprehensive metabolic panel (BMP + Alb, Alk         Phos, ALT, AST, Total. Bili, TP)          (E78.5) Hyperlipidemia with target LDL less than 130  Comment:   Plan: atorvastatin (LIPITOR) 40 MG tablet, Lipid         " panel reflex to direct LDL Fasting            (E66.01) Morbid obesity (H)  Comment:   Plan: Recd she consider WW as this could be covered by her employer ([employer]) through the wellness program.      (I82.4Z1) Deep vein thrombosis (DVT) of distal vein of right lower extremity, unspecified chronicity (H)  Comment:   Plan: reprinted her order for compression stockings so she could go order those at the medical supply store today    (F17.200) Tobacco use disorder  Comment: pt agrees to try nicotine patch  Plan: nicotine (NICODERM CQ) 14 MG/24HR 24 hr patch            (R73.03) Prediabetes  Comment:   Plan: Hemoglobin A1c            (R63.5) Weight gain  Comment:   Plan: TSH with free T4 reflex            (K21.9) Chronic GERD  Comment:   Plan: omeprazole (PRILOSEC) 40 MG DR capsule            (Z23) Need for vaccination  Comment: pt declines covid booster today. She also declines shingrex for now.  Plan: TDAP VACCINE (Adacel, Boostrix)  [[id number]]          *Recd she go schedule her mammogram as that is due.    Spent 30 minutes FTF with patient of which over 50% was spent discussing the coordination of care and management of their issues as noted.      Yolette Prabhakar PA-C

## 2022-02-09 LAB
ALBUMIN SERPL-MCNC: 3.6 G/DL (ref 3.4–5)
ALP SERPL-CCNC: 136 U/L (ref 40–150)
ALT SERPL W P-5'-P-CCNC: 31 U/L (ref 0–50)
ANION GAP SERPL CALCULATED.3IONS-SCNC: 4 MMOL/L (ref 3–14)
AST SERPL W P-5'-P-CCNC: 26 U/L (ref 0–45)
BILIRUB SERPL-MCNC: 0.3 MG/DL (ref 0.2–1.3)
BUN SERPL-MCNC: 9 MG/DL (ref 7–30)
CALCIUM SERPL-MCNC: 9.1 MG/DL (ref 8.5–10.1)
CHLORIDE BLD-SCNC: 104 MMOL/L (ref 94–109)
CHOLEST SERPL-MCNC: 133 MG/DL
CO2 SERPL-SCNC: 30 MMOL/L (ref 20–32)
CREAT SERPL-MCNC: 0.71 MG/DL (ref 0.52–1.04)
FASTING STATUS PATIENT QL REPORTED: YES
GFR SERPL CREATININE-BSD FRML MDRD: >90 ML/MIN/1.73M2
GLUCOSE BLD-MCNC: 106 MG/DL (ref 70–99)
HDLC SERPL-MCNC: 46 MG/DL
LDLC SERPL CALC-MCNC: 69 MG/DL
NONHDLC SERPL-MCNC: 87 MG/DL
POTASSIUM BLD-SCNC: 4.2 MMOL/L (ref 3.4–5.3)
PROT SERPL-MCNC: 7.5 G/DL (ref 6.8–8.8)
SODIUM SERPL-SCNC: 138 MMOL/L (ref 133–144)
TRIGL SERPL-MCNC: 91 MG/DL
TSH SERPL DL<=0.005 MIU/L-ACNC: 1.9 MU/L (ref 0.4–4)

## 2022-02-09 NOTE — RESULT ENCOUNTER NOTE
"Molly,    Fortunately your hemoglobin A1c remained the same at 6.1%.    As we discussed this does put you in the \"prediabetes\" range.  Weight loss would help so consider a program such as WW which could be covered by [employer].      Your cholesterol profile looks good except for a slight low HDL. That is the good cholesterol and exercise can help bring that up.    Your thyroid test, electrolytes, liver and kidney function tests were normal.    Yolette Prabhakar PA-C  "

## 2022-05-10 ENCOUNTER — OFFICE VISIT (OUTPATIENT)
Dept: FAMILY MEDICINE | Facility: CLINIC | Age: 62
End: 2022-05-10
Payer: COMMERCIAL

## 2022-05-10 VITALS
TEMPERATURE: 97.7 F | HEIGHT: 72 IN | OXYGEN SATURATION: 100 % | BODY MASS INDEX: 39.68 KG/M2 | WEIGHT: 293 LBS | HEART RATE: 70 BPM | SYSTOLIC BLOOD PRESSURE: 162 MMHG | RESPIRATION RATE: 16 BRPM | DIASTOLIC BLOOD PRESSURE: 101 MMHG

## 2022-05-10 DIAGNOSIS — I10 HTN (HYPERTENSION), BENIGN: Primary | ICD-10-CM

## 2022-05-10 DIAGNOSIS — J30.2 SEASONAL ALLERGIC RHINITIS, UNSPECIFIED TRIGGER: ICD-10-CM

## 2022-05-10 DIAGNOSIS — F17.200 TOBACCO USE DISORDER: ICD-10-CM

## 2022-05-10 DIAGNOSIS — E55.9 VITAMIN D DEFICIENCY: ICD-10-CM

## 2022-05-10 PROCEDURE — 99214 OFFICE O/P EST MOD 30 MIN: CPT | Performed by: PHYSICIAN ASSISTANT

## 2022-05-10 RX ORDER — NICOTINE 21 MG/24HR
1 PATCH, TRANSDERMAL 24 HOURS TRANSDERMAL EVERY 24 HOURS
Qty: 28 PATCH | Refills: 3 | Status: SHIPPED | OUTPATIENT
Start: 2022-05-10

## 2022-05-10 RX ORDER — CHOLECALCIFEROL (VITAMIN D3) 50 MCG
1 TABLET ORAL DAILY
Qty: 90 TABLET | Refills: 3 | Status: SHIPPED | OUTPATIENT
Start: 2022-05-10 | End: 2024-01-09

## 2022-05-10 RX ORDER — MULTIVIT WITH MINERALS/LUTEIN
1 TABLET ORAL DAILY
COMMUNITY
Start: 2022-05-10

## 2022-05-10 RX ORDER — CHLORTHALIDONE 25 MG/1
25 TABLET ORAL DAILY
Qty: 90 TABLET | Refills: 1 | Status: SHIPPED | OUTPATIENT
Start: 2022-05-10 | End: 2022-09-23

## 2022-05-10 ASSESSMENT — PAIN SCALES - GENERAL: PAINLEVEL: NO PAIN (0)

## 2022-05-10 NOTE — PROGRESS NOTES
HPI: Molly is a 62 yo female smoker here for f/u HTN  She was prescribed Nicoderm to help her quit smoking in Feb  This did help her cut back on smoking, but does continue to smoke  She hasn't had time to have her BP checked at work.    Obesity: She is trying to limit her late night eating  She does like to snack  She plans to start walking soon  She is in the Prediabetes range.  She is down 3lbs    She has L ear plugged  She has freq sinus congestion  She is on Zyrtec and flonase  Eyes are itchy    She did order her compression stockings, but still hasn't had them shipped so she was told these will be reshipped.        Past Medical History:   Diagnosis Date     DVT (deep venous thrombosis) (H) 9/2012     HTN (hypertension), benign      Thrombosis of leg     Sept 2012     Uterine fibroid      Past Surgical History:   Procedure Laterality Date     HYSTERECTOMY TOTAL ABDOMINAL  1/2/2013    Procedure: HYSTERECTOMY TOTAL ABDOMINAL;  TOTAL ABDOMINAL HYSTERECTOMY, multiple myomecty, right salpinectomy lysis of adhesions ;  Surgeon: Tariq Brewer MD;  Location: [facility] OR     HYSTERECTOMY, PAP NO LONGER INDICATED       MYOMECTOMY UTERUS  1/2/2013    Procedure: MYOMECTOMY UTERUS;;  Surgeon: Tariq Brewer MD;  Location: [facility] OR     TONSILLECTOMY       wisdom teeth       Social History     Tobacco Use     Smoking status: Current Every Day Smoker     Packs/day: 0.50     Smokeless tobacco: Never Used   Substance Use Topics     Alcohol use: Yes     Alcohol/week: 0.0 standard drinks     Comment: occ       Current Outpatient Medications   Medication Sig Dispense Refill     acetaminophen (TYLENOL) 500 MG tablet Take 2 tablets (1,000 mg) by mouth every 6 hours as needed for mild pain 50 tablet 0     atorvastatin (LIPITOR) 40 MG tablet Take 1 tablet (40 mg) by mouth daily 90 tablet 3     cetirizine (ZYRTEC) 10 MG tablet Take 1 tablet (10 mg) by mouth daily       COMPRESSION STOCKINGS 22-30mmHg, knee high stockings, wear  "as directed 2 each 1     fluticasone (FLONASE) 50 MCG/ACT nasal spray SHAKE LIQUID AND USE 1 TO 2 SPRAYS IN EACH NOSTRIL DAILY 16 g 1     lisinopril (ZESTRIL) 20 MG tablet Take 1 tablet (20 mg) by mouth daily 90 tablet 3     metoprolol succinate ER (TOPROL-XL) 100 MG 24 hr tablet Take 1 tablet (100 mg) by mouth daily 90 tablet 3     nicotine (NICODERM CQ) 14 MG/24HR 24 hr patch Place 1 patch onto the skin every 24 hours 28 patch 1     omeprazole (PRILOSEC) 40 MG DR capsule Take 1 capsule (40 mg) by mouth daily Take 30-60 minutes before a meal. 90 capsule 3     Allergies   Allergen Reactions     No Known Drug Allergy      FAMILY HISTORY NOTED AND REVIEWED     PHYSICAL EXAM:    BP (!) 162/101 (BP Location: Right arm, Patient Position: Sitting, Cuff Size: Adult Large)   Pulse 70   Temp 97.7  F (36.5  C) (Temporal)   Resp 16   Ht 1.93 m (6' 4\")   Wt (!) 161 kg (355 lb)   LMP 09/06/2012   SpO2 100%   BMI 43.21 kg/m      Patient appears non toxic    Assessment and Plan:     (I10) HTN (hypertension), benign  (primary encounter diagnosis)  Comment: she needs to get some BP readings outside of office. Added chlorthalidone today. F/u one month.  Plan: Home Blood Pressure Monitor Order for DME -         ONLY FOR DME, chlorthalidone (HYGROTON) 25 MG         tablet            (F17.200) Tobacco use disorder  Comment:   Plan: nicotine (NICODERM CQ) 14 MG/24HR 24 hr patch        Refilled. Cessation advised.    (E55.9) Vitamin D deficiency  Comment:   Plan: vitamin D3 (CHOLECALCIFEROL) 50 mcg (2000         units) tablet            (J30.2) Seasonal allergic rhinitis, unspecified trigger  Comment:   Plan: recd zyrtec and srinivasan Prabhakar PA-C      "

## 2022-05-10 NOTE — PROGRESS NOTES
Patient identified using two patient identifiers.  Ear exam showing wax occlusion completed by provider.  Solution: warm water was placed in the left ear(s) via irrigation tool: elephant ear.  Cortez Ca CMA on 5/10/2022 at 2:13 PM

## 2022-06-23 ENCOUNTER — OFFICE VISIT (OUTPATIENT)
Dept: FAMILY MEDICINE | Facility: CLINIC | Age: 62
End: 2022-06-23
Payer: COMMERCIAL

## 2022-06-23 VITALS
SYSTOLIC BLOOD PRESSURE: 121 MMHG | DIASTOLIC BLOOD PRESSURE: 80 MMHG | HEIGHT: 72 IN | TEMPERATURE: 97.1 F | RESPIRATION RATE: 14 BRPM | OXYGEN SATURATION: 94 % | WEIGHT: 293 LBS | HEART RATE: 72 BPM | BODY MASS INDEX: 39.68 KG/M2

## 2022-06-23 DIAGNOSIS — I10 HTN (HYPERTENSION), BENIGN: Primary | ICD-10-CM

## 2022-06-23 DIAGNOSIS — F17.200 TOBACCO USE DISORDER: ICD-10-CM

## 2022-06-23 PROCEDURE — 99213 OFFICE O/P EST LOW 20 MIN: CPT | Performed by: PHYSICIAN ASSISTANT

## 2022-06-23 ASSESSMENT — PAIN SCALES - GENERAL: PAINLEVEL: NO PAIN (0)

## 2022-06-23 NOTE — PROGRESS NOTES
{PROVIDER CHARTING PREFERENCE:846243}    Subjective   Molly is a 62 year old{ACCOMPANIED BY STATEMENT (Optional):493435}, presenting for the following health issues:  Follow Up      HPI     {SUPERLIST (Optional):678807}  {additonal problems for provider to add (Optional):065101}    Review of Systems   {ROS COMP (Optional):161864}      Objective    LMP 09/06/2012   There is no height or weight on file to calculate BMI.  Physical Exam   {Exam List (Optional):779164}    {Diagnostic Test Results (Optional):467296}    {AMBULATORY ATTESTATION (Optional):305222}            .  ..

## 2022-06-23 NOTE — PROGRESS NOTES
"HPI: Molly is a 63 yo female here for f/u HTN.  We added chlorthalidone last month  She has not checked her BP since starting this new medication  She is advised to get a home BP monitor  She has lost a few more pounds  She has been walking for exercise.    Smoker: she continues to smoke 2-3 cigs/day  Pt under stress as her mother is in the hospital and not eating  She declines lung ca screening with CT \"at this moment\"      Past Medical History:   Diagnosis Date     DVT (deep venous thrombosis) (H) 9/2012     HTN (hypertension), benign      Thrombosis of leg     Sept 2012     Uterine fibroid      Past Surgical History:   Procedure Laterality Date     HYSTERECTOMY TOTAL ABDOMINAL  1/2/2013    Procedure: HYSTERECTOMY TOTAL ABDOMINAL;  TOTAL ABDOMINAL HYSTERECTOMY, multiple myomecty, right salpinectomy lysis of adhesions ;  Surgeon: Tariq Brewer MD;  Location: [facility] OR     HYSTERECTOMY, PAP NO LONGER INDICATED       MYOMECTOMY UTERUS  1/2/2013    Procedure: MYOMECTOMY UTERUS;;  Surgeon: Tariq Brewer MD;  Location: [facility] OR     TONSILLECTOMY       wisdom teeth       Social History     Tobacco Use     Smoking status: Current Every Day Smoker     Packs/day: 0.50     Smokeless tobacco: Never Used   Substance Use Topics     Alcohol use: Yes     Alcohol/week: 0.0 standard drinks     Comment: occ       Current Outpatient Medications   Medication Sig Dispense Refill     acetaminophen (TYLENOL) 500 MG tablet Take 2 tablets (1,000 mg) by mouth every 6 hours as needed for mild pain 50 tablet 0     atorvastatin (LIPITOR) 40 MG tablet Take 1 tablet (40 mg) by mouth daily 90 tablet 3     cetirizine (ZYRTEC) 10 MG tablet Take 1 tablet (10 mg) by mouth daily       chlorthalidone (HYGROTON) 25 MG tablet Take 1 tablet (25 mg) by mouth daily 90 tablet 1     COMPRESSION STOCKINGS 22-30mmHg, knee high stockings, wear as directed 2 each 1     fluticasone (FLONASE) 50 MCG/ACT nasal spray SHAKE LIQUID AND USE 1 TO 2 SPRAYS " "IN EACH NOSTRIL DAILY 16 g 1     lisinopril (ZESTRIL) 20 MG tablet Take 1 tablet (20 mg) by mouth daily 90 tablet 3     metoprolol succinate ER (TOPROL-XL) 100 MG 24 hr tablet Take 1 tablet (100 mg) by mouth daily 90 tablet 3     multivitamin (CENTRUM SILVER) tablet Take 1 tablet by mouth daily       nicotine (NICODERM CQ) 14 MG/24HR 24 hr patch Place 1 patch onto the skin every 24 hours 28 patch 3     omeprazole (PRILOSEC) 40 MG DR capsule Take 1 capsule (40 mg) by mouth daily Take 30-60 minutes before a meal. 90 capsule 3     vitamin D3 (CHOLECALCIFEROL) 50 mcg (2000 units) tablet Take 1 tablet (50 mcg) by mouth daily 90 tablet 3     Allergies   Allergen Reactions     No Known Drug Allergy      FAMILY HISTORY NOTED AND REVIEWED    PHYSICAL EXAM:    /80 (BP Location: Right arm, Patient Position: Sitting, Cuff Size: Adult Large)   Pulse 72   Temp 97.1  F (36.2  C) (Temporal)   Resp 14   Ht 1.93 m (6' 4\")   Wt (!) 159.8 kg (352 lb 4.8 oz)   LMP 09/06/2012   SpO2 94%   BMI 42.88 kg/m      Patient appears non toxic    Assessment and Plan:     (I10) HTN (hypertension), benign  (primary encounter diagnosis)  Comment: pt walking more and lost a few more pounds. Her BP is so much better and now normal with the addition of chlorthalidone and pt agrees to continue this medication.  Plan: Home Blood Pressure Monitor Order for DME -         ONLY FOR DME        Recd she get a home BP monitor and bring in readings to f/u with new PCP in 3months. She should come fasting so that her glucose and A1c could be repeated as well    (F17.200) Tobacco use disorder  Comment:   Plan: she is cutting down. Declines lung ca screening      Yolette Prabhakar PA-C        "

## 2022-08-20 ENCOUNTER — HEALTH MAINTENANCE LETTER (OUTPATIENT)
Age: 62
End: 2022-08-20

## 2022-09-23 ENCOUNTER — OFFICE VISIT (OUTPATIENT)
Dept: FAMILY MEDICINE | Facility: CLINIC | Age: 62
End: 2022-09-23
Payer: COMMERCIAL

## 2022-09-23 VITALS
BODY MASS INDEX: 39.68 KG/M2 | HEART RATE: 79 BPM | WEIGHT: 293 LBS | SYSTOLIC BLOOD PRESSURE: 145 MMHG | HEIGHT: 72 IN | RESPIRATION RATE: 16 BRPM | OXYGEN SATURATION: 98 % | DIASTOLIC BLOOD PRESSURE: 85 MMHG

## 2022-09-23 DIAGNOSIS — K21.9 CHRONIC GERD: ICD-10-CM

## 2022-09-23 DIAGNOSIS — E78.5 HYPERLIPIDEMIA WITH TARGET LDL LESS THAN 130: ICD-10-CM

## 2022-09-23 DIAGNOSIS — F17.200 TOBACCO USE DISORDER: ICD-10-CM

## 2022-09-23 DIAGNOSIS — I10 HTN (HYPERTENSION), BENIGN: Primary | ICD-10-CM

## 2022-09-23 DIAGNOSIS — R73.03 PREDIABETES: ICD-10-CM

## 2022-09-23 PROCEDURE — 99213 OFFICE O/P EST LOW 20 MIN: CPT | Performed by: INTERNAL MEDICINE

## 2022-09-23 RX ORDER — CHLORTHALIDONE 25 MG/1
25 TABLET ORAL DAILY
Qty: 90 TABLET | Refills: 3 | Status: SHIPPED | OUTPATIENT
Start: 2022-09-23 | End: 2023-03-08

## 2022-09-23 RX ORDER — LISINOPRIL 20 MG/1
20 TABLET ORAL DAILY
Qty: 90 TABLET | Refills: 3 | Status: SHIPPED | OUTPATIENT
Start: 2022-09-23 | End: 2023-10-30

## 2022-09-23 NOTE — PROGRESS NOTES
"  Assessment & Plan     HTN (hypertension), benign  Slightly elevated today  Smoking cessation would help  Monitor   - chlorthalidone (HYGROTON) 25 MG tablet  Dispense: 90 tablet; Refill: 3  - lisinopril (ZESTRIL) 20 MG tablet  Dispense: 90 tablet; Refill: 3    Hyperlipidemia with target LDL less than 130  On statin    Chronic GERD  On rx    Prediabetes  She declines blood work today, states would like to do at her next follow-up     Tobacco use disorder  Cessation encouraged, she states she has cut back      Return in about 6 months (around 3/23/2023) for Routine preventive, with me, in person, sooner if symptoms worsen or do not improve.    DO KYLEIGH Hinds [facility] NADIA Barnes is a 62 year old, presenting for the following health issues:  Establish Care      HPI     Patient is establishing care.  Refills on BP meds requested  Smoker  Declines blood work today  Not currently monitoring BP  Working on weight loss and healthy lifestyle      Review of Systems   Constitutional, HEENT, cardiovascular, pulmonary, gi and gu systems are negative, except as otherwise noted.      Objective    BP (!) 145/85 (BP Location: Right arm, Patient Position: Sitting, Cuff Size: Adult Large)   Pulse 79   Resp 16   Ht 1.93 m (6' 4\")   Wt (!) 158.8 kg (350 lb)   LMP 09/06/2012   SpO2 98%   BMI 42.60 kg/m    Body mass index is 42.6 kg/m .  Physical Exam     GENERAL APPEARANCE: AAOx3, no distress. Well developed.    PSYCH: appropriate mood and affect.                   "

## 2023-03-08 DIAGNOSIS — K21.9 CHRONIC GERD: ICD-10-CM

## 2023-03-08 DIAGNOSIS — I10 HTN (HYPERTENSION), BENIGN: ICD-10-CM

## 2023-03-08 DIAGNOSIS — E78.5 HYPERLIPIDEMIA WITH TARGET LDL LESS THAN 130: ICD-10-CM

## 2023-03-08 RX ORDER — METOPROLOL SUCCINATE 100 MG/1
100 TABLET, EXTENDED RELEASE ORAL DAILY
Qty: 90 TABLET | Refills: 3 | Status: SHIPPED | OUTPATIENT
Start: 2023-03-08 | End: 2024-01-09

## 2023-03-08 RX ORDER — ATORVASTATIN CALCIUM 40 MG/1
40 TABLET, FILM COATED ORAL DAILY
Qty: 90 TABLET | Refills: 3 | Status: SHIPPED | OUTPATIENT
Start: 2023-03-08 | End: 2024-01-09

## 2023-03-08 RX ORDER — OMEPRAZOLE 40 MG/1
40 CAPSULE, DELAYED RELEASE ORAL DAILY
Qty: 90 CAPSULE | Refills: 0 | Status: SHIPPED | OUTPATIENT
Start: 2023-03-08 | End: 2023-05-25

## 2023-03-08 RX ORDER — CHLORTHALIDONE 25 MG/1
25 TABLET ORAL DAILY
Qty: 90 TABLET | Refills: 3 | Status: SHIPPED | OUTPATIENT
Start: 2023-03-08 | End: 2024-01-09

## 2023-04-15 ENCOUNTER — HEALTH MAINTENANCE LETTER (OUTPATIENT)
Age: 63
End: 2023-04-15

## 2023-05-25 DIAGNOSIS — K21.9 CHRONIC GERD: ICD-10-CM

## 2023-05-25 RX ORDER — OMEPRAZOLE 40 MG/1
CAPSULE, DELAYED RELEASE ORAL
Qty: 90 CAPSULE | Refills: 0 | Status: SHIPPED | OUTPATIENT
Start: 2023-05-25 | End: 2023-08-23

## 2023-05-25 NOTE — TELEPHONE ENCOUNTER
Prescription approved per [facility] Refill Protocol.  Reanna Das RN  [facility]

## 2023-08-23 DIAGNOSIS — K21.9 CHRONIC GERD: ICD-10-CM

## 2023-08-23 RX ORDER — OMEPRAZOLE 40 MG/1
CAPSULE, DELAYED RELEASE ORAL
Qty: 90 CAPSULE | Refills: 0 | Status: SHIPPED | OUTPATIENT
Start: 2023-08-23 | End: 2023-11-16

## 2023-09-16 ENCOUNTER — HEALTH MAINTENANCE LETTER (OUTPATIENT)
Age: 63
End: 2023-09-16

## 2023-10-30 DIAGNOSIS — I10 HTN (HYPERTENSION), BENIGN: ICD-10-CM

## 2023-10-30 RX ORDER — LISINOPRIL 20 MG/1
20 TABLET ORAL DAILY
Qty: 90 TABLET | Refills: 0 | Status: SHIPPED | OUTPATIENT
Start: 2023-10-30 | End: 2024-01-09

## 2023-10-30 NOTE — TELEPHONE ENCOUNTER
Former [facility] patient  LOV 9- [facility]  No future OV scheduled    Pharm note given    Jacqui Randall, RT (R)

## 2023-11-16 ENCOUNTER — OFFICE VISIT (OUTPATIENT)
Dept: FAMILY MEDICINE | Facility: CLINIC | Age: 63
End: 2023-11-16
Payer: COMMERCIAL

## 2023-11-16 VITALS
WEIGHT: 293 LBS | BODY MASS INDEX: 43.46 KG/M2 | DIASTOLIC BLOOD PRESSURE: 84 MMHG | SYSTOLIC BLOOD PRESSURE: 138 MMHG | OXYGEN SATURATION: 95 % | TEMPERATURE: 97.4 F | RESPIRATION RATE: 12 BRPM | HEART RATE: 60 BPM

## 2023-11-16 DIAGNOSIS — I10 BENIGN ESSENTIAL HYPERTENSION: ICD-10-CM

## 2023-11-16 DIAGNOSIS — Z12.31 VISIT FOR SCREENING MAMMOGRAM: ICD-10-CM

## 2023-11-16 DIAGNOSIS — K21.9 CHRONIC GERD: Primary | ICD-10-CM

## 2023-11-16 DIAGNOSIS — E66.01 MORBID OBESITY (H): ICD-10-CM

## 2023-11-16 DIAGNOSIS — R73.03 PREDIABETES: ICD-10-CM

## 2023-11-16 DIAGNOSIS — E78.5 HYPERLIPIDEMIA WITH TARGET LDL LESS THAN 130: ICD-10-CM

## 2023-11-16 DIAGNOSIS — R09.81 NASAL CONGESTION: ICD-10-CM

## 2023-11-16 LAB
ALBUMIN SERPL BCG-MCNC: 4.3 G/DL (ref 3.5–5.2)
ALP SERPL-CCNC: 128 U/L (ref 40–150)
ALT SERPL W P-5'-P-CCNC: 27 U/L (ref 0–50)
ANION GAP SERPL CALCULATED.3IONS-SCNC: 12 MMOL/L (ref 7–15)
AST SERPL W P-5'-P-CCNC: 35 U/L (ref 0–45)
BILIRUB SERPL-MCNC: 0.3 MG/DL
BUN SERPL-MCNC: 13.1 MG/DL (ref 8–23)
CALCIUM SERPL-MCNC: 10 MG/DL (ref 8.8–10.2)
CHLORIDE SERPL-SCNC: 99 MMOL/L (ref 98–107)
CHOLEST SERPL-MCNC: 144 MG/DL
CREAT SERPL-MCNC: 0.75 MG/DL (ref 0.51–0.95)
DEPRECATED HCO3 PLAS-SCNC: 29 MMOL/L (ref 22–29)
EGFRCR SERPLBLD CKD-EPI 2021: 90 ML/MIN/1.73M2
GLUCOSE SERPL-MCNC: 106 MG/DL (ref 70–99)
HBA1C MFR BLD: 6.1 % (ref 0–5.6)
HDLC SERPL-MCNC: 45 MG/DL
LDLC SERPL CALC-MCNC: 81 MG/DL
NONHDLC SERPL-MCNC: 99 MG/DL
POTASSIUM SERPL-SCNC: 3.9 MMOL/L (ref 3.4–5.3)
PROT SERPL-MCNC: 7.5 G/DL (ref 6.4–8.3)
SODIUM SERPL-SCNC: 140 MMOL/L (ref 135–145)
TRIGL SERPL-MCNC: 90 MG/DL
TSH SERPL DL<=0.005 MIU/L-ACNC: 3.68 UIU/ML (ref 0.3–4.2)

## 2023-11-16 PROCEDURE — 83036 HEMOGLOBIN GLYCOSYLATED A1C: CPT | Performed by: FAMILY MEDICINE

## 2023-11-16 PROCEDURE — 80053 COMPREHEN METABOLIC PANEL: CPT | Performed by: FAMILY MEDICINE

## 2023-11-16 PROCEDURE — 99214 OFFICE O/P EST MOD 30 MIN: CPT | Performed by: FAMILY MEDICINE

## 2023-11-16 PROCEDURE — 84443 ASSAY THYROID STIM HORMONE: CPT | Performed by: FAMILY MEDICINE

## 2023-11-16 PROCEDURE — 80061 LIPID PANEL: CPT | Performed by: FAMILY MEDICINE

## 2023-11-16 PROCEDURE — 36415 COLL VENOUS BLD VENIPUNCTURE: CPT | Performed by: FAMILY MEDICINE

## 2023-11-16 RX ORDER — LISINOPRIL 20 MG/1
20 TABLET ORAL DAILY
Qty: 90 TABLET | Refills: 0 | Status: CANCELLED | OUTPATIENT
Start: 2023-11-16

## 2023-11-16 RX ORDER — OMEPRAZOLE 40 MG/1
CAPSULE, DELAYED RELEASE ORAL
Qty: 90 CAPSULE | Refills: 0 | Status: CANCELLED | OUTPATIENT
Start: 2023-11-16

## 2023-11-16 RX ORDER — FLUTICASONE PROPIONATE 50 MCG
1-2 SPRAY, SUSPENSION (ML) NASAL DAILY
Qty: 16 G | Refills: 3 | Status: SHIPPED | OUTPATIENT
Start: 2023-11-16

## 2023-11-16 RX ORDER — OMEPRAZOLE 40 MG/1
CAPSULE, DELAYED RELEASE ORAL
Qty: 90 CAPSULE | Refills: 1 | Status: SHIPPED | OUTPATIENT
Start: 2023-11-16 | End: 2024-03-11

## 2023-11-16 ASSESSMENT — PAIN SCALES - GENERAL: PAINLEVEL: MILD PAIN (2)

## 2023-11-16 NOTE — LETTER
November 20, 2023      Molly Aldridge  [address]   [place] [address]        Dear ,    We are writing to inform you of your test results.    I have reviewed your recent labs. Here are the results:   -Cholesterol levels (LDL,HDL, Triglycerides) are normal.  ADVISE: rechecking in 1 year.   -Liver and gallbladder tests are normal (ALT,AST, Alk phos, bilirubin), kidney function is normal (Cr, GFR), sodium is normal, potassium is normal, calcium is normal,   -TSH (thyroid stimulating hormone) level is normal which indicates normal thyroid function.   -Hemoglobin A1c which is your 3-month blood sugar test is normal and stable.  And prediabetes.     Resulted Orders   Lipid panel reflex to direct LDL Fasting   Result Value Ref Range    Cholesterol 144 <200 mg/dL    Triglycerides 90 <150 mg/dL    Direct Measure HDL 45 (L) >=50 mg/dL    LDL Cholesterol Calculated 81 <=100 mg/dL    Non HDL Cholesterol 99 <130 mg/dL    Narrative    Cholesterol  Desirable:  <200 mg/dL    Triglycerides  Normal:  Less than 150 mg/dL  Borderline High:  150-199 mg/dL  High:  200-499 mg/dL  Very High:  Greater than or equal to 500 mg/dL    Direct Measure HDL  Female:  Greater than or equal to 50 mg/dL   Male:  Greater than or equal to 40 mg/dL    LDL Cholesterol  Desirable:  <100mg/dL  Above Desirable:  100-129 mg/dL   Borderline High:  130-159 mg/dL   High:  160-189 mg/dL   Very High:  >= 190 mg/dL    Non HDL Cholesterol  Desirable:  130 mg/dL  Above Desirable:  130-159 mg/dL  Borderline High:  160-189 mg/dL  High:  190-219 mg/dL  Very High:  Greater than or equal to 220 mg/dL   Comprehensive metabolic panel   Result Value Ref Range    Sodium 140 135 - 145 mmol/L      Comment:      Reference intervals for this test were updated on 09/26/2023 to more accurately reflect our healthy population. There may be differences in the flagging of prior results with similar values performed with this method. Interpretation of those prior  results can be made in the context of the updated reference intervals.     Potassium 3.9 3.4 - 5.3 mmol/L    Carbon Dioxide (CO2) 29 22 - 29 mmol/L    Anion Gap 12 7 - 15 mmol/L    Urea Nitrogen 13.1 8.0 - 23.0 mg/dL    Creatinine 0.75 0.51 - 0.95 mg/dL    GFR Estimate 90 >60 mL/min/1.73m2    Calcium 10.0 8.8 - 10.2 mg/dL    Chloride 99 98 - 107 mmol/L    Glucose 106 (H) 70 - 99 mg/dL    Alkaline Phosphatase 128 40 - 150 U/L      Comment:      Reference intervals for this test were updated on 11/14/2023 to more accurately reflect our healthy population. There may be differences in the flagging of prior results with similar values performed with this method. Interpretation of those prior results can be made in the context of the updated reference intervals.    AST 35 0 - 45 U/L      Comment:      Reference intervals for this test were updated on 6/12/2023 to more accurately reflect our healthy population. There may be differences in the flagging of prior results with similar values performed with this method. Interpretation of those prior results can be made in the context of the updated reference intervals.    ALT 27 0 - 50 U/L      Comment:      Reference intervals for this test were updated on 6/12/2023 to more accurately reflect our healthy population. There may be differences in the flagging of prior results with similar values performed with this method. Interpretation of those prior results can be made in the context of the updated reference intervals.      Protein Total 7.5 6.4 - 8.3 g/dL    Albumin 4.3 3.5 - 5.2 g/dL    Bilirubin Total 0.3 <=1.2 mg/dL   TSH with free T4 reflex   Result Value Ref Range    TSH 3.68 0.30 - 4.20 uIU/mL   Hemoglobin A1c   Result Value Ref Range    Hemoglobin A1C 6.1 (H) 0.0 - 5.6 %      Comment:      Normal <5.7%   Prediabetes 5.7-6.4%    Diabetes 6.5% or higher     Note: Adopted from ADA consensus guidelines.       If you have any questions or concerns, please call the clinic at  the number listed above.       Sincerely,      Noe Veronica MD

## 2023-11-16 NOTE — PROGRESS NOTES
Assessment & Plan     Chronic GERD    - omeprazole (PRILOSEC) 40 MG DR capsule; TAKE 1 CAPSULE(40 MG) BY MOUTH DAILY 30 TO 60 MINUTES BEFORE A MEAL    Nasal congestion    - fluticasone (FLONASE) 50 MCG/ACT nasal spray; Spray 1-2 sprays into both nostrils daily    Visit for screening mammogram    - MA SCREENING DIGITAL BILAT - Future  (s+30); Future    Prediabetes    - TSH with free T4 reflex; Future  - Hemoglobin A1c; Future    Hyperlipidemia with target LDL less than 130    - Lipid panel reflex to direct LDL Fasting; Future  - Comprehensive metabolic panel; Future    Benign essential hypertension  Pressure recheck was better currently on lisinopril chlorthalidone along with metoprolol.    Morbid obesity (H)           Nicotine/Tobacco Cessation:  She reports that she has been smoking. She has been smoking an average of .5 packs per day. She has never used smokeless tobacco.  Nicotine/Tobacco Cessation Plan:     [facility] [place] Barnes is a 63 year old, presenting for the following health issues:  Recheck Medication (Fasting )         No data to display                History of Present Illness       Reason for visit:  Refiles of my meds    She eats 0-1 servings of fruits and vegetables daily.She consumes 1 sweetened beverage(s) daily.She exercises with enough effort to increase her heart rate 9 or less minutes per day.  She exercises with enough effort to increase her heart rate 3 or less days per week.   She is taking medications regularly.         Hyperlipidemia Follow-Up    Are you regularly taking any medication or supplement to lower your cholesterol?   Yes- Atorvastatin  Are you having muscle aches or other side effects that you think could be caused by your cholesterol lowering medication?  No    Hypertension Follow-up    Do you check your blood pressure regularly outside of the clinic? No   Are you following a low salt diet? No  Are your blood pressures ever more than  140 on the top number (systolic) OR more   than 90 on the bottom number (diastolic), for example 140/90? NA        Review of Systems   Constitutional, HEENT, cardiovascular, pulmonary, gi and gu systems are negative, except as otherwise noted.      Objective    BP (!) 144/80   Pulse 60   Temp 97.4  F (36.3  C) (Tympanic)   Resp 12   Wt (!) 161.9 kg (357 lb)   LMP 09/06/2012   SpO2 95%   BMI 43.46 kg/m    Body mass index is 43.46 kg/m .  Physical Exam   GENERAL: healthy, alert and no distress  NECK: no adenopathy, no asymmetry, masses, or scars and thyroid normal to palpation  RESP: lungs clear to auscultation - no rales, rhonchi or wheezes  CV: regular rate and rhythm, normal S1 S2, no S3 or S4, no murmur, click or rub, no peripheral edema and peripheral pulses strong  ABDOMEN: soft, nontender, no hepatosplenomegaly, no masses and bowel sounds normal  MS: no gross musculoskeletal defects noted, no edema

## 2023-11-16 NOTE — COMMUNITY RESOURCES LIST (ENGLISH)
11/16/2023   [facility]  N/A  For questions about this resource list or additional care needs, please contact your primary care clinic or care manager.  Phone: [phone]   Email: N/A   Address: [address] [place] [address]   Hours: N/A        Food and Nutrition       Food pantry  1  [facility] - Food Shelf Distance: 1.28 miles      Delivery, Pickup   [address] [id number]  Language: English, Trinidadian  Hours: Mon - Thu 9:00 AM - 11:00 AM  Fees: Free   Phone: (491) 827-2377 Email: shay@[email or web address].org Website: http://[facility].org/[facility]/     2  [facility] [facility] [employer] [facility] Distance: 1.29 miles      Pickup   [address] [place] [address]  Language: Setswana, English, Oromo, Polish, Swahili  Hours: Mon - Fri 9:00 AM - 5:00 PM  Fees: Free   Phone: (183) 746-8483 Email: communications@[employer] Website: http://www.[employer].org     SNAP application assistance  3  [facility] - Office of Multicultural Services Distance: 0.74 miles      Phone/Virtual   [address] [id number]  Language: American Sign Language, Lithuanian, Setswana, English, Occitan, Jay, Oromo, Anguillan, Polish, Trinidadian, Swahili, Jonn, Albanian  Hours: Mon - Tue 9:00 AM - 4:00 PM , Wed 10:00 AM - 5:00 PM , Thu - Fri 9:00 AM - 4:00 PM  Fees: Free   Phone: (441) 866-9992 Email: oms@[employer]. Website: http://www.[employer]./residents/human-services/multi-cultural-services     4  [facility] Distance: 1.76 miles      Phone/Virtual   [address] [place] [address]  Language: English, Polish  Hours: Mon - Fri 10:00 AM - 5:00 PM  Fees: Free   Phone: (807) 843-3261 Email: win@[facility].org Website: https://www.[facility].org/jeannine-house     Soup kitchen or free meals  [address] [place] [address]  [place] [facility] [employer] Distance: 1.28 miles      In-Person   [address] [place] [address]  Language: English  Hours: Mon - Wed 12:00 PM - 1:00 PM  Fees: Free   Phone: (816) 486-4409 Email: shay@[facility].[employer] Website: https://[place].[facility].org/northern/[facility]/     6  [facility] & Fishes Distance: 1.37 miles      Pickup   [address] [place] [address]  Language: English, Tongan  Hours: Mon - Thu 5:15 PM - 6:15 PM  Fees: Free   Phone: (169) 412-4074 Ext.214 Email: [email or web address]@[employer] Website: http://www.[employer].org/          Important Numbers & Websites       Emergency Services   911  [facility] Services   311  Poison Control   (774) 938-9070  Suicide Prevention Lifeline   (456) 766-2192 (TALK)  Child Abuse Hotline   (956) 989-1003 (4-A-Child)  Sexual Assault Hotline   (387) 628-7158 (HOPE)  National Runaway Safeline   (152) 313-7758 (RUNAWAY)  All-Options Talkline   (668) 374-6502  Substance Abuse Referral   (767) 844-8993 (HELP)

## 2024-01-09 ENCOUNTER — OFFICE VISIT (OUTPATIENT)
Dept: FAMILY MEDICINE | Facility: CLINIC | Age: 64
End: 2024-01-09
Payer: COMMERCIAL

## 2024-01-09 VITALS
OXYGEN SATURATION: 100 % | DIASTOLIC BLOOD PRESSURE: 84 MMHG | TEMPERATURE: 97 F | SYSTOLIC BLOOD PRESSURE: 133 MMHG | HEART RATE: 63 BPM | BODY MASS INDEX: 39.68 KG/M2 | WEIGHT: 293 LBS | HEIGHT: 72 IN

## 2024-01-09 DIAGNOSIS — E66.01 MORBID OBESITY (H): ICD-10-CM

## 2024-01-09 DIAGNOSIS — I10 HTN (HYPERTENSION), BENIGN: Primary | Chronic | ICD-10-CM

## 2024-01-09 DIAGNOSIS — E78.5 HYPERLIPIDEMIA WITH TARGET LDL LESS THAN 130: ICD-10-CM

## 2024-01-09 DIAGNOSIS — K21.9 CHRONIC GERD: ICD-10-CM

## 2024-01-09 DIAGNOSIS — F17.200 TOBACCO USE DISORDER: Chronic | ICD-10-CM

## 2024-01-09 DIAGNOSIS — E55.9 VITAMIN D DEFICIENCY: ICD-10-CM

## 2024-01-09 DIAGNOSIS — R73.03 PREDIABETES: ICD-10-CM

## 2024-01-09 PROCEDURE — 99214 OFFICE O/P EST MOD 30 MIN: CPT | Performed by: PHYSICIAN ASSISTANT

## 2024-01-09 RX ORDER — ATORVASTATIN CALCIUM 40 MG/1
40 TABLET, FILM COATED ORAL DAILY
Qty: 90 TABLET | Refills: 3 | Status: SHIPPED | OUTPATIENT
Start: 2024-01-09 | End: 2024-03-11

## 2024-01-09 RX ORDER — CHLORTHALIDONE 25 MG/1
25 TABLET ORAL DAILY
Qty: 90 TABLET | Refills: 3 | Status: SHIPPED | OUTPATIENT
Start: 2024-01-09 | End: 2024-03-11

## 2024-01-09 RX ORDER — LISINOPRIL 20 MG/1
20 TABLET ORAL DAILY
Qty: 90 TABLET | Refills: 3 | Status: SHIPPED | OUTPATIENT
Start: 2024-01-09 | End: 2024-03-11

## 2024-01-09 RX ORDER — METOPROLOL SUCCINATE 100 MG/1
100 TABLET, EXTENDED RELEASE ORAL DAILY
Qty: 90 TABLET | Refills: 3 | Status: SHIPPED | OUTPATIENT
Start: 2024-01-09 | End: 2024-03-11

## 2024-01-09 RX ORDER — CHOLECALCIFEROL (VITAMIN D3) 50 MCG
1 TABLET ORAL DAILY
Qty: 90 TABLET | Refills: 3 | Status: SHIPPED | OUTPATIENT
Start: 2024-01-09

## 2024-01-09 ASSESSMENT — PAIN SCALES - GENERAL: PAINLEVEL: NO PAIN (0)

## 2024-01-09 NOTE — PATIENT INSTRUCTIONS
-Start Wegovy 0.25 mg injection once weekly - after one month may increase to 0.50 mg after 1 month -> can gradually increase to get weight benefit.     -Refilled meds for 1 year    -Re-start Vitamin D

## 2024-01-09 NOTE — PROGRESS NOTES
"Assessment & Plan     HTN (hypertension), benign  Hypertension well-controlled.  Healthy diet.  Weight loss.  Exercise.  Pursuing weight loss medication below.    - lisinopril (ZESTRIL) 20 MG tablet  Dispense: 90 tablet; Refill: 3  - metoprolol succinate ER (TOPROL XL) 100 MG 24 hr tablet  Dispense: 90 tablet; Refill: 3  - chlorthalidone (HYGROTON) 25 MG tablet  Dispense: 90 tablet; Refill: 3  - Semaglutide-Weight Management (WEGOVY) 0.25 MG/0.5ML pen  Dispense: 2 mL; Refill:     Prediabetes  Morbid obesity (H)  Chronic GERD    Plan to pursue GLP-1.  Wegovy.  0.25 mg once weekly for a month and gradually titrating dose.  Reviewed side effects and expectations.  No family history of thyroid cancer.  Would be a great candidate for this medication.  Using combination with diet and exercise.  Option for weight management clinic.    - Semaglutide-Weight Management (WEGOVY) 0.25 MG/0.5ML pen  Dispense: 2 mL; Refill: 1    Tobacco use disorder  Discussed cessation uses NicoDerm patches.  Less than half pack a day.  Motivated to quit.  Declines additional medical intervention.  - Semaglutide-Weight Management (WEGOVY) 0.25 MG/0.5ML pen  Dispense: 2 mL; Refill: 1    Hyperlipidemia with target LDL less than 130  Continue statin.  Well-tolerated.  - atorvastatin (LIPITOR) 40 MG tablet  Dispense: 90 tablet; Refill: 3      Vitamin D deficiency  Off vitamin D.  Refilled.  - vitamin D3 (CHOLECALCIFEROL) 50 mcg (2000 units) tablet  Dispense: 90 tablet; Refill: 3      30 minutes spent by me on the date of the encounter on chart review, review of test results, interpretation of tests, patient visit, and documentation       BMI:   Estimated body mass index is 44.16 kg/m  as calculated from the following:    Height as of this encounter: 1.93 m (6' 4\").    Weight as of this encounter: 164.6 kg (362 lb 12.8 oz).   Weight management plan: Discussed healthy diet and exercise guidelines      Return in about 6 months (around 7/9/2024) for " "follow-up per plan.    The likelihood of other entities in the differential is insufficient to justify any further testing for them at this time. This was explained to the patient. The patient was advised that persistent or worsening symptoms would require further evaluation. Patient advised to call the office and if unable to reach to go to the emergency room if they develop any new or worsening symptoms. Expressed understanding and agreement with above stated plan.     Liborio Quinn PA-C  [facility] NADIA Aldridge is a 63 year old female presenting for the following health issues:  Patient presents with:  Recheck Medication  Establish Care    Here today to establish care as well as for medication refills.    History of hypertension, prediabetes(6.1% recent A1c) morbid obesity, chronic GERD, tobacco use disorder, and hyperlipidemia.    Recent labs reviewed. A1c stable. Wishes to pursue weight loss medications such as Wegovy.    Works in housekeeping at [employer].      Wt Readings from Last 2 Encounters:   01/09/24 (!) 164.6 kg (362 lb 12.8 oz)   11/16/23 (!) 161.9 kg (357 lb)         Review of Systems   Constitutional, HEENT, cardiovascular, pulmonary, GI, , musculoskeletal, neuro, skin, endocrine and psych systems are negative, except as otherwise noted.      Objective    /84 (BP Location: Left arm, Patient Position: Sitting, Cuff Size: Adult Large)   Pulse 63   Temp 97  F (36.1  C) (Temporal)   Ht 1.93 m (6' 4\")   Wt (!) 164.6 kg (362 lb 12.8 oz)   LMP 09/06/2012   SpO2 100%   BMI 44.16 kg/m    6' 4\"  362 lbs 12.8 oz    Physical Exam   GENERAL: healthy, alert and no distress  EYES: Eyes grossly normal to inspection, PERRL and conjunctivae and sclerae normal  NECK: no adenopathy, no asymmetry, masses, or scars and thyroid normal to palpation  RESP: lungs clear to auscultation - no rales, rhonchi or wheezes  CV: regular rate and rhythm, normal S1 S2, " no S3 or S4, no murmur, click or rub, no peripheral edema  MS: no gross musculoskeletal defects noted, no edema  SKIN: no suspicious lesions or rashes  NEURO: Normal strength and tone, mentation intact and speech normal  PSYCH: mentation appears normal, affect normal/bright

## 2024-01-15 DIAGNOSIS — E66.01 MORBID OBESITY (H): ICD-10-CM

## 2024-01-15 DIAGNOSIS — E78.5 HYPERLIPIDEMIA WITH TARGET LDL LESS THAN 130: ICD-10-CM

## 2024-01-15 DIAGNOSIS — I10 HTN (HYPERTENSION), BENIGN: Primary | Chronic | ICD-10-CM

## 2024-01-15 DIAGNOSIS — K21.9 CHRONIC GERD: ICD-10-CM

## 2024-01-15 DIAGNOSIS — R73.03 PREDIABETES: ICD-10-CM

## 2024-01-16 ENCOUNTER — PATIENT OUTREACH (OUTPATIENT)
Dept: CARE COORDINATION | Facility: CLINIC | Age: 64
End: 2024-01-16
Payer: COMMERCIAL

## 2024-01-25 NOTE — TELEPHONE ENCOUNTER
Liborio Quinn PA-C  Cs Triage Im5 hours ago (12:02 PM)     MG  Signed script for Zepbound. Additional medication similar to Wegovy. Unfortunately, with these medications we are running into shortages.       Called patient regarding PCP message above. She verbalized understanding.   
Pt called clinic requesting alternative med for weight loss. Pt spoke to pharmacy who informed her that wegovy has been on back order for three weeks, and not sure when the medication will be back in stock,     [facility]     Routing to provider, please advise.     Can we leave a detailed message on this number? YES  Phone number patient can be reached at: Home number on file There is no home phone number on file.    Maria G Sharif RN  [facility] Triage    
Pt calling, states the pharmacy zepbound was sent to also does not have it in stock. Writer suggested to call around to different pharmacies to see which had it in stock and then call back and we will send it to that pharmnacy.     HUAN ARIAS RN on 1/25/2024 at 11:47 AM    
TO PCP    Pt calling back, [facility] specialty pharmacy has zepbound medication in stock.    Pended.    HUAN ARIAS RN on 1/25/2024 at 12:11 PM    
No

## 2024-03-11 ENCOUNTER — OFFICE VISIT (OUTPATIENT)
Dept: FAMILY MEDICINE | Facility: CLINIC | Age: 64
End: 2024-03-11
Payer: COMMERCIAL

## 2024-03-11 VITALS
HEART RATE: 66 BPM | DIASTOLIC BLOOD PRESSURE: 72 MMHG | SYSTOLIC BLOOD PRESSURE: 136 MMHG | HEIGHT: 72 IN | TEMPERATURE: 97.9 F | RESPIRATION RATE: 20 BRPM | BODY MASS INDEX: 39.68 KG/M2 | OXYGEN SATURATION: 100 % | WEIGHT: 293 LBS

## 2024-03-11 DIAGNOSIS — R73.03 PREDIABETES: ICD-10-CM

## 2024-03-11 DIAGNOSIS — E55.9 VITAMIN D DEFICIENCY: ICD-10-CM

## 2024-03-11 DIAGNOSIS — E66.01 MORBID OBESITY (H): Primary | ICD-10-CM

## 2024-03-11 DIAGNOSIS — K21.9 CHRONIC GERD: ICD-10-CM

## 2024-03-11 DIAGNOSIS — I10 HTN (HYPERTENSION), BENIGN: Chronic | ICD-10-CM

## 2024-03-11 DIAGNOSIS — E78.5 HYPERLIPIDEMIA WITH TARGET LDL LESS THAN 130: ICD-10-CM

## 2024-03-11 PROCEDURE — 99214 OFFICE O/P EST MOD 30 MIN: CPT | Performed by: FAMILY MEDICINE

## 2024-03-11 RX ORDER — LISINOPRIL 20 MG/1
20 TABLET ORAL DAILY
Qty: 90 TABLET | Refills: 3 | Status: SHIPPED | OUTPATIENT
Start: 2024-03-11

## 2024-03-11 RX ORDER — OMEPRAZOLE 40 MG/1
CAPSULE, DELAYED RELEASE ORAL
Qty: 90 CAPSULE | Refills: 1 | Status: SHIPPED | OUTPATIENT
Start: 2024-03-11

## 2024-03-11 RX ORDER — CHLORTHALIDONE 25 MG/1
25 TABLET ORAL DAILY
Qty: 90 TABLET | Refills: 3 | Status: SHIPPED | OUTPATIENT
Start: 2024-03-11

## 2024-03-11 RX ORDER — ATORVASTATIN CALCIUM 40 MG/1
40 TABLET, FILM COATED ORAL DAILY
Qty: 90 TABLET | Refills: 3 | Status: SHIPPED | OUTPATIENT
Start: 2024-03-11

## 2024-03-11 RX ORDER — METOPROLOL SUCCINATE 100 MG/1
100 TABLET, EXTENDED RELEASE ORAL DAILY
Qty: 90 TABLET | Refills: 3 | Status: SHIPPED | OUTPATIENT
Start: 2024-03-11

## 2024-03-11 NOTE — COMMUNITY RESOURCES LIST (ENGLISH)
03/11/2024   [facility]  N/A  For questions about this resource list or additional care needs, please contact your primary care clinic or care manager.  Phone: [phone]   Email: N/A   Address: [address] [place] [address]   Hours: N/A        Hotlines and Helplines       Hotline - Housing crisis  1  [facility] Distance: 1.99 miles      Phone/Virtual   [address] [place] [address]  Language: English  Hours: Mon - Sun Open 24 Hours   Phone: (283) 413-8545 Email: communications@[facility].org Website: https://[facility]-[place].org/oursaviourshousing/     2  [facility] Distance: 2.84 miles      Phone/Virtual   [address] [place] [address]  Language: English  Hours: Mon - Sun Open 24 Hours  Fees: Free   Phone: (122) 918-9324 Email: info@[employer]ola.[employer]NOW Website: http://www.saintolaf.SolarNOW/          Housing       Coordinated Entry access point  3  [facility] [place] ([facility] - Housing Services Distance: 2.12 miles      In-Person   [address] [place] [address]  Language: English  Hours: Mon - Fri 9:00 AM - 5:00 PM  Fees: Free   Phone: (188) 980-7977 Email: housing@[facility].org Website: http://www.[facility].org/housing     4  [facility] - Adult long term [facility] Distance: 2.84 miles      In-Person   [address] [place] [address]  Language: English  Hours: Mon - Sat 10:00 PM - 5:00 PM  Fees: Free   Phone: (213) 668-8743 Email: info@[employer]af.org Website: http://www.saintolaf.org/     Drop-in center or day shelter  5  [facility] [employer] - [place] Community Support Program Drop-In Center Distance: 1.28 miles      Phone/Virtual   [address] [facility] [address] [place] [address]  Language: English  Hours: Mon 12:00 PM - 5:00 PM , Tue 11:00 AM - 6:00 PM , Thu 11:00 AM - 6:00 PM , Fri 12:00 PM - 5:00 PM , Sat 10:00 AM - 5:00 PM  Fees: Free   Phone: (639) 691-6158 Email: general@[employer].La  Website: http://www.[facility].org/[place]-community-support-program/     6  [facility] Distance: 2.23 miles      In-Person   [address] [place] [address]  Language: English, Malaysian, Yoruba  Hours: Mon - Sat 7:00 AM - 3:00 PM  Fees: Free, Self Pay   Phone: (517) 223-6015 Email: info@[employer] Website: https://www.[employer]/locations/opportunity-Ruby/     Housing search assistance  7  [facility] - Office of Multicultural Services Distance: 0.74 miles      Phone/Virtual   [address] [place] [address]  Language: American Sign Language, Albanian, Faroese, English, Ecuadorean, Jay, Oromo, Jamaican, Malaysian, Yoruba, Swahili, Danish, Italian  Hours: Mon - Tue 9:00 AM - 4:00 PM , Wed 10:00 AM - 5:00 PM , Thu - Fri 9:00 AM - 4:00 PM  Fees: Free   Phone: (792) 223-1478 Email: oms@[employer]. Website: http://www.[facility]/residents/human-services/multi-cultural-services     8  [facility] ([facility] Distance: 1.71 miles      In-Person   [address] [place] [address]  Language: English, Malaysian, Yoruba  Hours: Mon - Fri 8:30 AM - 4:30 PM  Fees: Free   Phone: (884) 844-1018 Email: jeanette@[facility].org Website: http://www.[facility].org/     Shelter for families  9   [facility] Distance: 18.32 miles      In-Person   [address] [place] [address]  Language: English  Hours: Mon - Fri 3:00 PM - 9:00 AM , Sat - Sun Open 24 Hours  Fees: Free   Phone: (152) 633-8383 Ext.1 Website: https://www.saintLoku.org/2020/07/03/emergency-family-shelter/     Shelter for individuals  10  [facility] Distance: 1.99 miles      In-Person   [address] [place] [address]  Language: English  Hours: Mon - Sun Open 24 Hours  Fees: Free   Phone: (403) 760-5407 Email: communications@[facility].org Website: https://[facility]-[place].org/oursaviourshousing/      11  [facility] Distance: 3.37 miles      In-Person   [address] [place] [address]  Language: English  Hours: Mon - Fri 4:00 PM - 9:00 AM  Fees: Free   Phone: (535) 387-2129 Email: erik@[facility].[employer] Website: https://[facility].org/[facility]/          Important Numbers & Websites       Emergency Services   911  [facility] Services   311  Poison Control   (127) 708-3880  Suicide Prevention Lifeline   (713) 126-8178 (TALK)  Child Abuse Hotline   (849) 312-4846 (4-A-Child)  Sexual Assault Hotline   (979) 254-6849 (HOPE)  National Runaway Safeline   (520) 336-6516 (RUNAWAY)  All-Options Talkline   (876) 710-3265  Substance Abuse Referral   (835) 334-4829 (HELP)

## 2024-03-11 NOTE — PROGRESS NOTES
Assessment & Plan     Morbid obesity (H)  Discussed the risks and benefits of these medications including significant weight loss and potential benefits to her health also outside of nausea vomiting possible pancreatitis including the fact that some of these effects may not be reversible.  May not be reversible.  Patient does have obesity with comorbidities including hyperlipidemia increased risk of cardiovascular disease and hypertension.  And prediabetes.  We discussed maintenance of a careful diet encouraged her to go to a dietitian.  Nutrition referral placed  - tirzepatide-Weight Management (ZEPBOUND) 2.5 MG/0.5ML prefilled pen; Inject 0.5 mLs (2.5 mg) Subcutaneous every 7 days    Hyperlipidemia with target LDL less than 130  Continue current medications.  - atorvastatin (LIPITOR) 40 MG tablet; Take 1 tablet (40 mg) by mouth daily  - tirzepatide-Weight Management (ZEPBOUND) 2.5 MG/0.5ML prefilled pen; Inject 0.5 mLs (2.5 mg) Subcutaneous every 7 days    HTN (hypertension), benign  Currently controlled on the following medications will continue  - chlorthalidone (HYGROTON) 25 MG tablet; Take 1 tablet (25 mg) by mouth daily  - lisinopril (ZESTRIL) 20 MG tablet; Take 1 tablet (20 mg) by mouth daily  - metoprolol succinate ER (TOPROL XL) 100 MG 24 hr tablet; Take 1 tablet (100 mg) by mouth daily  - tirzepatide-Weight Management (ZEPBOUND) 2.5 MG/0.5ML prefilled pen; Inject 0.5 mLs (2.5 mg) Subcutaneous every 7 days    Chronic GERD  Reflux  - omeprazole (PRILOSEC) 40 MG DR capsule; TAKE 1 CAPSULE(40 MG) BY MOUTH DAILY 30 TO 60 MINUTES BEFORE A MEAL  - tirzepatide-Weight Management (ZEPBOUND) 2.5 MG/0.5ML prefilled pen; Inject 0.5 mLs (2.5 mg) Subcutaneous every 7 days    Prediabetes  Lab Results   Component Value Date    A1C 6.1 11/16/2023    A1C 6.1 02/08/2022    A1C 6.1 03/22/2021    A1C 5.9 06/10/2014       - tirzepatide-Weight Management (ZEPBOUND) 2.5 MG/0.5ML prefilled pen; Inject 0.5 mLs (2.5 mg)  "Subcutaneous every 7 days    Vitamin D deficiency      Review of prior external note(s) from - reviewing previous records about obesity treatment.  Review of the result(s) of each unique test - reviewed A1c BMP  Prescription drug management          Return in about 4 weeks (around 4/8/2024).    Norberto Barnes is a 63 year old, presenting for the following health issues:  New Patient (Establishing care: Transfer from [facility] ) and Medication Request (Unable to receive Zepbound from previous provider due to providers credentials )      3/11/2024    11:21 AM   Additional Questions   Roomed by Hans   Accompanied by Self         3/11/2024    Information    services provided? No     Via the Health Maintenance questionnaire, the patient has reported the following services have been completed -Mammogram, this information has been sent to the abstraction team.  HPI   Obesity.  Patient was seen at another clinic and was prescribed Zepbound and was told by her insurance that this needed to be signed by a physician by a physicians assistant so she is here to establish care.  And to get this represcribed.  Patient also has has a history of hyperlipidemia hypertension chronic GERD prediabetes and vitamin D deficiency.  She also has a history of smoking which we discussed she is not interested in pursuing but is pursuing any further smoking cessation discussion today.                  Objective    /72   Pulse 66   Temp 97.9  F (36.6  C) (Oral)   Resp 20   Ht 1.854 m (6' 1\")   Wt (!) 166.2 kg (366 lb 6.4 oz)   LMP 09/06/2012   SpO2 100%   BMI 48.34 kg/m    Body mass index is 48.34 kg/m .  Physical Exam  Vitals reviewed.   Constitutional:       General: She is not in acute distress.     Appearance: She is well-developed. She is obese. She is not diaphoretic.   HENT:      Head: Normocephalic.   Eyes:      General: No scleral icterus.     Conjunctiva/sclera: Conjunctivae normal.   Neck: "      Thyroid: No thyromegaly.   Cardiovascular:      Rate and Rhythm: Normal rate and regular rhythm.      Heart sounds: Normal heart sounds. No murmur heard.  Pulmonary:      Effort: Pulmonary effort is normal. No respiratory distress.      Breath sounds: Normal breath sounds. No wheezing.   Musculoskeletal:      Cervical back: Normal range of motion.      Left lower leg: No edema.   Skin:     General: Skin is warm and dry.   Neurological:      Mental Status: She is alert and oriented to person, place, and time.   Psychiatric:         Behavior: Behavior normal.         Thought Content: Thought content normal.         Judgment: Judgment normal.                    Signed Electronically by: Aelx Rosas MD

## 2024-04-09 NOTE — PROGRESS NOTES
HPI: Cyst on back x 7d that was bothering her but it drained last week so is smaller now  It was painful and red prior to draining.    HTN: pharmacy prescribed atenolol 100mg bid but she is only taking QD  Not sure if that was pharmacy error.  Pt has high chol per gyn but no treatment was advised    Smoker: continues to smoke 1/2 ppd  She thinks sometimes about quitting  She is worried this would cause weight gain    Pt has heartburn aubrey with eating red sauce  She would like prilosec since that helps    Past Medical History:   Diagnosis Date     DVT (deep venous thrombosis) (H) 9/2012     HTN (hypertension), benign      Thrombosis of leg     Sept 2012     Uterine fibroid      Past Surgical History:   Procedure Laterality Date     HYSTERECTOMY TOTAL ABDOMINAL  1/2/2013    Procedure: HYSTERECTOMY TOTAL ABDOMINAL;  TOTAL ABDOMINAL HYSTERECTOMY, multiple myomecty, right salpinectomy lysis of adhesions ;  Surgeon: Tariq Brewer MD;  Location:  OR     HYSTERECTOMY, PAP NO LONGER INDICATED       MYOMECTOMY UTERUS  1/2/2013    Procedure: MYOMECTOMY UTERUS;;  Surgeon: Tariq Brewer MD;  Location:  OR     TONSILLECTOMY       wisdom teeth       Social History   Substance Use Topics     Smoking status: Current Every Day Smoker     Packs/day: 0.50     Smokeless tobacco: Never Used     Alcohol use 0.0 oz/week     0 Standard drinks or equivalent per week      Comment: occ       Current Outpatient Prescriptions   Medication Sig Dispense Refill     omeprazole (PRILOSEC) 40 MG capsule Take 1 capsule (40 mg) by mouth daily Take 30-60 minutes before a meal. 30 capsule 6     atenolol (TENORMIN) 100 MG tablet TAKE 1 TABLET BY MOUTH DAILY. 90 tablet 2     lisinopril (PRINIVIL/ZESTRIL) 10 MG tablet TAKE 1 TABLET(10 MG) BY MOUTH DAILY 90 tablet 1     fluticasone (FLONASE) 50 MCG/ACT spray Spray 1-2 sprays into both nostrils daily 1 Bottle 11     acetaminophen (TYLENOL) 500 MG tablet Take 2 tablets (1,000 mg) by mouth  "every 6 hours as needed for mild pain 50 tablet 0     COMPRESSION STOCKINGS 22-30mmHg, knee high stockings, wear as directed 2 each 1     [DISCONTINUED] atenolol (TENORMIN) 100 MG tablet TAKE 1 TABLET BY MOUTH DAILY. 90 tablet 2     Allergies   Allergen Reactions     No Known Drug Allergy      FAMILY HISTORY NOTED AND REVIEWED    PHYSICAL EXAM:    /80  Pulse 54  Temp 98.7  F (37.1  C) (Oral)  Ht 6' 1\" (1.854 m)  Wt (!) 322 lb (146.1 kg)  LMP 09/06/2012  SpO2 96%  BMI 42.48 kg/m2    Patient appears non toxic  R flank there is a darker slightly indurated spot where she had a seb cyst that had ruptured on it's own    Assessment and Plan:     (L72.3) Sebaceous cyst  (primary encounter diagnosis)  Comment: resolved spont  Plan: Pt reassured, no tx needed    (I10) HTN (hypertension), benign  Comment: BP high and wt up  Plan: lose weight and monitor BP. She doesn't want to increase dose of lisinopril today but agrees to have BP checked and contact me with readings    Patient Instructions   Recheck your blood pressure and call in readings.  I will recheck your cholesterol  Avoid fried foods, watch the portion sizes and quit smoking  Try to exercise regularly        (E78.5) Hyperlipidemia LDL goal <130  Comment:   Plan: Lipid Profile            (F17.200) Tobacco use disorder  Comment:   Plan: cessation advised. [place] Quit plan [place] given    (Z12.11) Screen for colon cancer  Comment:   Plan: Fecal colorectal cancer screen FIT - Future         (S+30)        Refuses colonoscopy    (Z11.59) Need for hepatitis C screening test  Comment:   Plan: Hepatitis C Screen Reflex to HCV RNA Quant and         Genotype            (K21.9) Gastroesophageal reflux disease, esophagitis presence not specified  Comment:   Plan: omeprazole (PRILOSEC) 40 MG capsule          Spent 25 minutes FTF with patient of which over 50% was spent discussing the coordination of care and management of their issues noted.        Yolette Prabhakar PA-C      " Yes

## 2024-04-10 ENCOUNTER — OFFICE VISIT (OUTPATIENT)
Dept: FAMILY MEDICINE | Facility: CLINIC | Age: 64
End: 2024-04-10
Payer: COMMERCIAL

## 2024-04-10 VITALS
SYSTOLIC BLOOD PRESSURE: 128 MMHG | WEIGHT: 293 LBS | RESPIRATION RATE: 16 BRPM | HEIGHT: 72 IN | BODY MASS INDEX: 39.68 KG/M2 | DIASTOLIC BLOOD PRESSURE: 87 MMHG | OXYGEN SATURATION: 100 % | TEMPERATURE: 98.3 F | HEART RATE: 71 BPM

## 2024-04-10 DIAGNOSIS — Z11.4 SCREENING FOR HIV (HUMAN IMMUNODEFICIENCY VIRUS): Primary | ICD-10-CM

## 2024-04-10 DIAGNOSIS — E66.01 MORBID OBESITY (H): ICD-10-CM

## 2024-04-10 DIAGNOSIS — I10 BENIGN ESSENTIAL HYPERTENSION: ICD-10-CM

## 2024-04-10 PROCEDURE — G2211 COMPLEX E/M VISIT ADD ON: HCPCS | Performed by: FAMILY MEDICINE

## 2024-04-10 PROCEDURE — 99213 OFFICE O/P EST LOW 20 MIN: CPT | Performed by: FAMILY MEDICINE

## 2024-04-10 NOTE — PROGRESS NOTES
"  Assessment & Plan     Benign essential hypertension  Blood pressure is well-controlled and she has enough refills to last year for the year we will continue to follow her and support her and her condition.    Morbid obesity (H)  The patient is very motivated to treat her morbid obesity I support her in seeing a dietitian and following up with the weight management center.    Screening for HIV (human immunodeficiency virus)  Patient declined HIV screening today and also declined other immunizations.    The longitudinal plan of care for the diagnosis(es)/condition(s) as documented were addressed during this visit. Due to the added complexity in care, I will continue to support Molly in the subsequent management and with ongoing continuity of care.      I spent a total of 21 minutes on the day of the visit.   Time spent by me doing chart review, history and exam, documentation and further activities per the note            Return in about 6 months (around 10/10/2024) for Hypertension.    Subjective   Molly is a 63 year old, presenting for the following health issues:  Follow Up (Weight management )        4/10/2024    11:28 AM   Additional Questions   Roomed by Hans         4/10/2024    Information    services provided? No     HPI   Patient presents for follow-up of hypertension and for obesity management.  She tells me that she was informed by her insurance company that she needed to see a weight management center to have GLP  approved.  Patient has a visit set up with a nutritionist and with a the weight management center to do further testing and treatment.                Objective    /87   Pulse 71   Temp 98.3  F (36.8  C) (Oral)   Resp 16   Ht 1.854 m (6' 1\")   Wt (!) 165.7 kg (365 lb 4.8 oz)   LMP 09/06/2012   SpO2 100%   BMI 48.20 kg/m    Body mass index is 48.2 kg/m .  Physical Exam  Vitals reviewed.   Constitutional:       General: She is not in acute distress.     " Appearance: She is well-developed. She is not diaphoretic.   HENT:      Head: Normocephalic.   Eyes:      General: No scleral icterus.     Conjunctiva/sclera: Conjunctivae normal.   Neck:      Thyroid: No thyromegaly.   Cardiovascular:      Rate and Rhythm: Normal rate and regular rhythm.      Heart sounds: Normal heart sounds. No murmur heard.  Pulmonary:      Effort: Pulmonary effort is normal. No respiratory distress.      Breath sounds: Normal breath sounds. No wheezing.   Musculoskeletal:      Cervical back: Normal range of motion.      Left lower leg: No edema.   Skin:     General: Skin is warm and dry.   Neurological:      Mental Status: She is alert and oriented to person, place, and time.   Psychiatric:         Behavior: Behavior normal.         Thought Content: Thought content normal.         Judgment: Judgment normal.                    Signed Electronically by: Alex Rosas MD

## 2024-04-10 NOTE — PATIENT INSTRUCTIONS
Patient Education   Here is the plan from today's visit    1. Benign essential hypertension  Blood pressure is well-controlled continue current medications follow-up in about 6 months.    2. Morbid obesity (H)  Support your plan to go see the weight management center I we will continue to have the help as needed.    3. Screening for HIV (human immunodeficiency virus)  I recommend screening for HIV and other conditions at the also returning for getting immunizations.          Please call or return to clinic if your symptoms don't go away.    Follow up plan  No follow-ups on file.    Thank you for coming to [facility] today.  Lab Testing:  **If you had lab testing today and your results are reassuring or normal they will be mailed to you or sent through [employer] within 7 days.   **If the lab tests need quick action we will call you with the results.  **If you are having labs done on a different day, please call [phone] to schedule at [facility] or [phone] for other [facility] locations. Labs do not offer walk-in appointments.  The phone number we will call with results is # [phone] (home) [phone] (work). If this is not the best number please call our clinic and change the number.  Medication Refills:  If you need any refills please call your pharmacy and they will contact us.   If you need to  your refill at a new pharmacy, please contact the new pharmacy directly. The new pharmacy will help you get your medications transferred faster.   Scheduling:  If you have any concerns about today's visit or wish to schedule another appointment please call our office during normal business hours [phone] (8-5:00 M-F). If you can no longer make a scheduled visit, please cancel via [employer] or call us to cancel.   If a referral was made to an [facility] specialty provider and you do not get a call from central scheduling, please refer to directions on your visit  summary or call our office during normal business hours for assistance.   If a Mammogram was ordered for you at the [facility] call [phone] to schedule or change your appointment.  If you had an XRay/CT/Ultrasound/MRI ordered the number is [phone] to schedule or change your radiology appointment.   [facility] has limited ultrasound appointments available on Wednesdays, if you would like your ultrasound at [facility], please call [phone] to schedule.   Medical Concerns:  If you have urgent medical concerns please call [phone] at any time of the day.    Alex Rosas MD

## 2024-04-13 ENCOUNTER — HEALTH MAINTENANCE LETTER (OUTPATIENT)
Age: 64
End: 2024-04-13

## 2024-05-28 ENCOUNTER — HOSPITAL ENCOUNTER (OUTPATIENT)
Dept: NUTRITION | Facility: CLINIC | Age: 64
Discharge: HOME | End: 2024-05-28
Attending: FAMILY MEDICINE | Admitting: FAMILY MEDICINE
Payer: COMMERCIAL

## 2024-05-28 DIAGNOSIS — E66.01 MORBID OBESITY (H): ICD-10-CM

## 2024-05-28 PROCEDURE — 97802 MEDICAL NUTRITION INDIV IN: CPT | Mod: TEL,95

## 2024-05-28 NOTE — PROGRESS NOTES
"OUTPATIENT CLINICAL NUTRITION SERVICES ASSESSMENT    Telephone-Visit Details     Type of service: Telephone Visit     Start Time: 11:00 am     End Time: 11:35 am    Originating Location (pt. Location): Home     Distant Location (provider location): On-site     Mode of Communication: phone call to pt's number listed in chart     REASON FOR ASSESSMENT  Erlinda Aldridge referred by Alex Rosas MD for MNT related to  Morbid obesity (H) [E66.01]  - Primary    Weight Loss - Overweight/Obesity    Patient accompanied by: N/A      ASSESSMENT   -PMH: HTN, s/p hysterectomy, hyperlipidemia, tobacco use disorder, morbid obesity, vitamin D def, chronic GERD    What brings you to our session today? Have you met with an RD in the past?  Pt has never met with an RD before. Reports is wanting to work on weight loss, has an appointment with weight loss clinic in June. Was talking with PCP about eating habits, was referred to see RD for support with changing this.     Would like to drop weight and make better food choices. Is a \"snacker\" at work - tends to get very hungry later in the evening, so snacks frequently and makes \"bad\" choices. Would like help with finding other options. Has been trying to buy less \"junk\" food - less cookies, chips. Tries to eat healthier food but is a picky eater.     Nutritional Details:   -Food allergies: none  -Food sensitivities: none - \"picky\"  -GI concerns: none  -Appetite: no concerns, hungrier in evening due to eating less during the day  -Pace of eating: takes time, which helps digestion  -Role of cooking: self  -Role of food shopping: self     Diet Recall:  Gets up around 10 - 11 am, tries to cook a nice breakfast or eat something before work  May get something on way to work if doesn't have time to eat at home - ex. [employer] cheeseburger and fries, sometimes is all that pt eats all day  Works night shift - will bring leftovers to work if cooked something previously, otherwise may get snacks " from cafeteria (does not bring snacks to work)  Comes home at night and is hungry but tries to find something not too heavy to eat before laying down ex. Will have a pb sandwich and some juice. Sometimes may cook eggs, sausage, toast before bed. Usually is too tired to make anything and goes to bed hungry.    Pt reports agreeing with idea that when hungry, people don't make positive choices. Feels that overeats     Snack: used to eat a lot of chips, crackers, candy, popcorn, etc. Is trying to choose healthier options - Triscuits, Cheez-Its, grapes, likes pb and crackers  Beverages: notes juice before bed  Dining out: prior to going to work, eating at work depends on whether ordered out or not     - used to have a lot of chocolate, sugary foods. Is trying to cut that down because notes that sugar weighs pt down, feels sluggish. Will have some chips now and then is done with them.    *would love to have salad with tomatoes, boiled eggs, croutons, lettuce, cheese, meat (ex. 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 Feels could walk more frequently  Limitations: lower back hurting, feels moves around well regardless though     NUTRITION FOCUSED PHYSICAL ASSESSMENT (NFPA) FOR DIAGNOSING MALNUTRITION  No: unable to assess due to telephone visit          Observed: unable to assess due to telephone visit     Obtained from Chart/Interdisciplinary Team: none noted    LABS  Labs reviewed   Latest Reference Range & Units Most Recent   Glucose 70 - 99 mg/dL 106 (H)  11/16/23 11:19   (H): Data is abnormally high  *unsure if fasting     Latest Reference Range & Units Most Recent   HDL Cholesterol >=50 mg/dL 45 (L)  11/16/23 11:19   Hemoglobin A1C 0.0 - 5.6 % 6.1 (H)  11/16/23 11:19  "  (L): Data is abnormally low  (H): Data is abnormally high    MEDICATIONS  Medications reviewed - tylenol, lipitor, celestone injection, chlorthalidone, vitamin D3, flonase, zestril, toprol, multivitamin, nicotine, patch, prilosec, naropin injection, zepbound     ANTHROPOMETRICS   Height: 1.854 m (6' 1\")  Weight: 165.7 kg (365 lbs)  BMI (kg/m2): 48.20   Weight Status: Obesity Grade III BMI >40  IBW: 165 lbs  ADJ BW: 215 lbs  %IBW: 221  Weight History:   Wt Readings from Last 15 Encounters:   04/10/24 (!) 165.7 kg (365 lb 4.8 oz)   03/11/24 (!) 166.2 kg (366 lb 6.4 oz)   01/09/24 (!) 164.6 kg (362 lb 12.8 oz)   11/16/23 (!) 161.9 kg (357 lb)   09/23/22 (!) 158.8 kg (350 lb)   06/23/22 (!) 159.8 kg (352 lb 4.8 oz)   05/10/22 (!) 161 kg (355 lb)   02/08/22 (!) 162.4 kg (358 lb)   08/24/21 (!) 153.8 kg (339 lb)   01/28/21 (!) 152.4 kg (336 lb)   09/15/20 147.4 kg (325 lb)   09/08/20 147.4 kg (325 lb)   09/02/20 147.5 kg (325 lb 3.2 oz)   04/15/19 (!) 150.6 kg (332 lb)   08/01/18 148.3 kg (327 lb)   Overall gain noted, seems relatively stable recently. Lack of updated weight so unsure of most recent changes - likely intentional due to desire for weight loss.     UBW? Changes to your weight recently?  Weight has been up and down recently - loses 3-4 lbs, hasn't been on scale lately. 362 lbs most recently. Wants to lose about 50 lbs.     Dosing weight: 97.7 kg using ADJ BW    ASSESSED NUTRITION NEEDS  Estimated Energy Needs: 2,443-2,931 kcals/day (25-30 Kcal/Kg)  Justification: (obese)  Estimated Protein Needs: 78-98 grams protein/day (0.8-1 g pro/Kg)  Justification: (preservation of lean body mass)  Estimated Fluid Needs: 2,931 mL/day (30 mL/kg)    ASSESSED MALNUTRITION STATUS  % Weight Loss: Weight loss does not meet criteria for malnutrition - if present, presumed intentional due to desire for weight management   % Intake: Decreased intake does not meet criteria for malnutrition - if present, presumed intentional due " to desire for weight management   Subcutaneous Fat Loss: Unable to assess  Loss of Muscle Mass: Unable to assess  Fluid Retention: None noted    Malnutrition Diagnosis: Unable to determine due to lack of NFPE related to telephone visit     DIAGNOSIS   Nutrition Diagnosis: Excessive energy intake related to minimal intake earlier in day, poor balance of nutrients, frequent snacking on high calorie, high fat foods as evidenced by dietary recall, pt report, and BMI of 48.20     INTERVENTIONS   Nutrition Prescription: general, healthy nutrition recommendations following a whole foods approach, focusing on MyPlate method for guidance. Ensuring intake of plenty of fruits and vegetables, of a variety of colors and types to promote antioxidant, vitamin/mineral intake. Focusing on lean proteins and avoiding foods that are high in saturated, trans fat. Implementing plenty of plant-based proteins such as nuts, seeds, legumes, and beans. Importance of WGs to promote bowel regularity via insoluble fiber intake. Soluble fiber regularly to help lower cholesterol levels. Focusing on reducing overall refined sugar, high calorie foods. Mindfulness with eating to determine when eating, why, how much.       IMPLEMENTATION   Assessed learning needs and learning preference: N/A  Teaching Method(s) used: Booklet / Handout  Explanation    Nutrition Education (Content):              a)  Discussed: went over pt's usual intake, overall dietary patterns. Discussed pt's usual meal schedule, encouraged pt to eat more earlier in the day to prevent being hungry in evening. Went over MyPlate method with pt. Importance of pairing foods together for snacks. Digestion of CHO alone leading to brief energy and crash, making sure to have meals/snacks for consistent energy. Pt agreeable to sending snack ideas. Answered pt's question(s). Etc.               b)  Provided the following handouts: Eat Right with MyPlate, Snacks, Healthy Snack List, General,  Healthful Nutrition Therapy (2022)  *included RD contact info with handouts, sent to home address per pt request    Nutrition Education (Application):              a)  Discussed current eating plans and/or recommended alternative food choices              b)  Patient verbalizes understanding of diet by creating goals that reflect diet recommendations and offering no additional questions or concerns at the end of the meeting     Anticipate good compliance   Stage of Change: some action mostly  Additional: has made changes to reduce intake of sugary/high fat snacks, likely will continue to implement changes based on discussion and/or future handouts provided     GOALS  N/A    - Pt reports desire to bring salad to work     FOLLOW UP/MONITORING   Progress towards goals will be monitored and evaluated per protocol and Practice Guidelines    Time Spent with Patient  Approx. 35 minutes    Aminta Dela Cruz RD, LD  Clinical Dietitian  Office: [phone]  Weekend pager: [phone]

## 2024-06-14 ENCOUNTER — TELEPHONE (OUTPATIENT)
Dept: ENDOCRINOLOGY | Facility: CLINIC | Age: 64
End: 2024-06-14
Payer: COMMERCIAL

## 2024-06-17 ENCOUNTER — OFFICE VISIT (OUTPATIENT)
Dept: ENDOCRINOLOGY | Facility: CLINIC | Age: 64
End: 2024-06-17
Payer: COMMERCIAL

## 2024-06-17 ENCOUNTER — TELEPHONE (OUTPATIENT)
Dept: ENDOCRINOLOGY | Facility: CLINIC | Age: 64
End: 2024-06-17

## 2024-06-17 VITALS
BODY MASS INDEX: 39.68 KG/M2 | OXYGEN SATURATION: 97 % | DIASTOLIC BLOOD PRESSURE: 80 MMHG | HEIGHT: 72 IN | HEART RATE: 72 BPM | WEIGHT: 293 LBS | SYSTOLIC BLOOD PRESSURE: 155 MMHG

## 2024-06-17 DIAGNOSIS — E78.5 HYPERLIPIDEMIA WITH TARGET LDL LESS THAN 130: Chronic | ICD-10-CM

## 2024-06-17 DIAGNOSIS — E66.01 MORBID OBESITY (H): Primary | ICD-10-CM

## 2024-06-17 PROCEDURE — 99204 OFFICE O/P NEW MOD 45 MIN: CPT | Performed by: INTERNAL MEDICINE

## 2024-06-17 ASSESSMENT — SLEEP AND FATIGUE QUESTIONNAIRES
HOW LIKELY ARE YOU TO NOD OFF OR FALL ASLEEP WHILE LYING DOWN TO REST IN THE AFTERNOON WHEN CIRCUMSTANCES PERMIT: SLIGHT CHANCE OF DOZING
HOW LIKELY ARE YOU TO NOD OFF OR FALL ASLEEP WHEN YOU ARE A PASSENGER IN A CAR FOR AN HOUR WITHOUT A BREAK: SLIGHT CHANCE OF DOZING
HOW LIKELY ARE YOU TO NOD OFF OR FALL ASLEEP WHILE WATCHING TV: MODERATE CHANCE OF DOZING
HOW LIKELY ARE YOU TO NOD OFF OR FALL ASLEEP WHILE SITTING INACTIVE IN A PUBLIC PLACE: WOULD NEVER DOZE
HOW LIKELY ARE YOU TO NOD OFF OR FALL ASLEEP WHILE SITTING AND READING: SLIGHT CHANCE OF DOZING
HOW LIKELY ARE YOU TO NOD OFF OR FALL ASLEEP WHILE SITTING AND TALKING TO SOMEONE: WOULD NEVER DOZE
HOW LIKELY ARE YOU TO NOD OFF OR FALL ASLEEP IN A CAR, WHILE STOPPED FOR A FEW MINUTES IN TRAFFIC: WOULD NEVER DOZE
HOW LIKELY ARE YOU TO NOD OFF OR FALL ASLEEP WHILE SITTING QUIETLY AFTER LUNCH WITHOUT ALCOHOL: SLIGHT CHANCE OF DOZING

## 2024-06-17 ASSESSMENT — PAIN SCALES - GENERAL: PAINLEVEL: NO PAIN (0)

## 2024-06-17 NOTE — TELEPHONE ENCOUNTER
Patient confirmed scheduled appointment:  Date: 12/9/24  Time: 10:30 am  Visit type: FABIOLA CAREY  Provider: Dr Calderon  Location: virtual  Testing/imaging: n/a  Additional notes: n/a

## 2024-06-17 NOTE — PROGRESS NOTES
"    New Medical Weight Management Consult    PATIENT:  Erlinda Aldridge  MRN:         [id number]  :         1960  MIKAEL:         2024    Dear Alex Rosas MD,    I had the pleasure of seeing your patient, Erlinda Aldridge.  Full intake/assessment done to determine barriers to weight loss success and develop a treatment plan.  Erlinda Aldridge is a 64 year old female interested in treatment of medical problems associated with weight.  Her weight today is 355 lbs 0 oz, Body mass index is 46.84 kg/m ., and she has the following co-morbidities:      2024    10:00 AM   --   I have the following health issues associated with obesity High Blood Pressure    High Cholesterol   I have the following symptoms associated with obesity Knee Pain    Back Pain            No data to display                    2024    10:00 AM   Referring Provider   Please name the provider who referred you to Medical Weight Management  If you do not know, please answer \"I Don't Know\" i dont know       Wt Readings from Last 4 Encounters:   24 (!) 161 kg (355 lb)   04/10/24 (!) 165.7 kg (365 lb 4.8 oz)   24 (!) 166.2 kg (366 lb 6.4 oz)   24 (!) 164.6 kg (362 lb 12.8 oz)           2024    10:00 AM   Weight History   How concerned are you about your weight? Very Concerned   I became overweight As an Adult   The following factors have contributed to my weight gain Eating Wrong Types of Food    Lack of Exercise   I have tried the following methods to lose weight Other   My lowest weight since age 19 was 185   My highest weight since age 19 was 200   The most weight I have ever lost was (lbs) 25   I have the following family history of obesity/being overweight Many of my relatives are overweight   How has your weight changed over the last year? Gained   How many pounds? 30           2024    10:00 AM   Diet Recall   Glass juice/day 3   Glass milk/day 0   Glass sugary drink/day 1   How many cans/bottles of sugar " pop/soda/tea/sports drinks do you drink in a day? 2   Diet drink/day 0   Alcohol Monthly or Less   Drinks/day 1 or 2           6/17/2024    10:00 AM   Eating Habits   Generally, my meals include foods like these bread, pasta, rice, potatoes, corn, crackers, sweet dessert, pop, or juice Almost Everyday   Generally, my meals include foods like these fried meats, brats, burgers, french fries, pizza, cheese, chips, or ice cream A Few Times a Week   Eat fast food (like McDonalds, Burger Turner, Taco Bell) A Few Times a Week   Eat at a buffet or sit-down restaurant Less Than Weekly   Eat most of my meals in front of the TV or computer Almost Everyday   Often skip meals, eat at random times, have no regular eating times Almost Everyday   Rarely sit down for a meal but snack or graze throughout Almost Everyday   Eat extra snacks between meals Almost Everyday   Eat most of my food at the end of the day Almost Everyday   Eat in the middle of the night or wake up at night to eat Less Than Weekly   Eat extra snacks to prevent or correct low blood sugar Never   Eat to prevent acid reflux or stomach pain Never   Worry about not having enough food to eat Never   I eat when I am depressed Never   I eat when I am stressed Never   I eat when I am bored Never   I eat when I am anxious Never   I eat when I am happy or as a reward Never   I feel hungry all the time even if I just have eaten Almost Everyday   Feeling full is important to me A Few Times a Week   I finish all the food on my plate even if I am already full Almost Everyday   I can't resist eating delicious food or walk past the good food/smell Less Than Weekly   I eat/snack without noticing that I am eating Once a Week   I eat when I am preparing the meal Almost Everyday   I eat more than usual when I see others eating Less Than Weekly   I have trouble not eating sweets, ice cream, cookies, or chips if they are around the house Never   I think about food all day Less Than  Weekly   What foods, if any, do you crave? Chips/Crackers           6/17/2024    10:00 AM   Activity/Exercise History   How much of a typical 12 hour day do you spend sitting? Most of the Day   How much of a typical 12 hour day do you spend lying down? Less Than Half the Day   How much of a typical day do you spend walking/standing? Most of the Day   How many hours (not including work) do you spend on the TV/Video Games/Computer/Tablet/Phone? 6 Hours or More   How many times a week are you active for the purpose of exercise? Never   What keeps you from being more active? Pain    Too tired       PAST MEDICAL HISTORY:  Past Medical History:   Diagnosis Date    DVT (deep venous thrombosis) (H) 9/2012    HTN (hypertension), benign     Thrombosis of leg     Sept 2012    Uterine fibroid            6/17/2024    10:00 AM   Work/Social History Reviewed With Patient   My employment status is Full-Time   My job is housekeeping   How much of your job is spent on the computer or phone? 100%   How many hours do you spend commuting to work daily? 30   What is your marital status? Single   Who do you live with? myself   Who does the food shopping? i do           6/17/2024    10:00 AM   Mental Health History Reviewed With Patient   Have you ever been physically or sexually abused? No   How often in the past 2 weeks have you felt little interest or pleasure in doing things? Not at all   Over the past 2 weeks how often have you felt down, depressed, or hopeless? Not at all           6/17/2024    10:00 AM   Sleep History Reviewed With Patient   How many hours do you sleep at night? 6       MEDICATIONS:   Current Outpatient Medications   Medication Sig Dispense Refill    acetaminophen (TYLENOL) 500 MG tablet Take 2 tablets (1,000 mg) by mouth every 6 hours as needed for mild pain 50 tablet 0    atorvastatin (LIPITOR) 40 MG tablet Take 1 tablet (40 mg) by mouth daily 90 tablet 3    chlorthalidone (HYGROTON) 25 MG tablet Take 1 tablet (25  "mg) by mouth daily 90 tablet 3    COMPRESSION STOCKINGS 22-30mmHg, knee high stockings, wear as directed 2 each 1    fluticasone (FLONASE) 50 MCG/ACT nasal spray Spray 1-2 sprays into both nostrils daily 16 g 3    lisinopril (ZESTRIL) 20 MG tablet Take 1 tablet (20 mg) by mouth daily 90 tablet 3    metoprolol succinate ER (TOPROL XL) 100 MG 24 hr tablet Take 1 tablet (100 mg) by mouth daily 90 tablet 3    multivitamin (CENTRUM SILVER) tablet Take 1 tablet by mouth daily      nicotine (NICODERM CQ) 14 MG/24HR 24 hr patch Place 1 patch onto the skin every 24 hours 28 patch 3    omeprazole (PRILOSEC) 40 MG DR capsule TAKE 1 CAPSULE(40 MG) BY MOUTH DAILY 30 TO 60 MINUTES BEFORE A MEAL 90 capsule 1    vitamin D3 (CHOLECALCIFEROL) 50 mcg (2000 units) tablet Take 1 tablet (50 mcg) by mouth daily 90 tablet 3    tirzepatide-Weight Management (ZEPBOUND) 2.5 MG/0.5ML prefilled pen Inject 0.5 mLs (2.5 mg) Subcutaneous every 7 days (Patient not taking: Reported on 6/17/2024) 2 mL 0       ALLERGIES:   Allergies   Allergen Reactions    No Known Drug Allergy        PHYSICAL EXAM:  BP (!) 155/80 (BP Location: Left arm, Patient Position: Sitting, Cuff Size: Adult Large)   Pulse 72   Ht 1.854 m (6' 1\")   Wt (!) 161 kg (355 lb)   LMP 09/06/2012   SpO2 97%   BMI 46.84 kg/m     A & O x 3  HEENT: NCAT, mucous membranes moist  Respirations unlabored  Location of obesity: Mixed Obesity    ASSESSMENT:  Erlinda is a patient with mature onset class 3 obesity without significant element of familial/genetic influence and with current health consequences. She does need aggressive weight loss plan due to severe weight, metabolic syndrome, hypertension, hyperlipidemia.      Erlinda Aldridge eats a high carb diet, eats fast food once or more per week, uses food as mood management, and has a disorganized meal pattern.    Her problem is complicated by strong craving/reward pathways    Her ability to lose weight is impacted by lack of " confidence.    PLAN:    Meal planning - focus on no between meal snacking, aggressive lowering of starches and cheese    Craving/Reward   Ancillary testing:  N/A.  Food Plan:  focus on no between meal snacking, aggressive lowering of starches and cheese.   Activity Plan:  Activity journal.  Supplementary:  N/A.   Medication:  The patient will begin medication in pursuit of improved medical status as influenced by body weight. She will start Wegovy or Zepbound through [facility] Pharmacist ([employer] employee).  There is a mutual understanding of the goals and risks of this therapy. The patient is in agreement. She is educated on dosage regimen and possible side effects.    RTC:    12 weeks.  I spent 45 minutes with this patient face to face and explained the conditions and plans (more than 50% of time was counseling/coordination of weight management).    Sincerely,    Joe Calderon MD

## 2024-06-17 NOTE — NURSING NOTE
"(   Chief Complaint   Patient presents with    New Patient     Weight management    )    ( Weight: (!) 161 kg (355 lb) )  ( Height: 185.4 cm (6' 1\") )  ( BMI (Calculated): 46.84 )  (   )  ( Cumulative weight loss (lbs): 0 )  (   )  (   )  (   )  (   )    ( BP: (!) 155/80 )  (   )  (   )  (   )  ( Pulse: 72 )  (   )  ( SpO2: 97 % )    (   Patient Active Problem List   Diagnosis    HTN (hypertension), benign    S/P hysterectomy    Hyperlipidemia with target LDL less than 130    Tobacco use disorder    Morbid obesity (H)    Vitamin D deficiency    Chronic GERD    )  (   Current Outpatient Medications   Medication Sig Dispense Refill    acetaminophen (TYLENOL) 500 MG tablet Take 2 tablets (1,000 mg) by mouth every 6 hours as needed for mild pain 50 tablet 0    atorvastatin (LIPITOR) 40 MG tablet Take 1 tablet (40 mg) by mouth daily 90 tablet 3    chlorthalidone (HYGROTON) 25 MG tablet Take 1 tablet (25 mg) by mouth daily 90 tablet 3    COMPRESSION STOCKINGS 22-30mmHg, knee high stockings, wear as directed 2 each 1    fluticasone (FLONASE) 50 MCG/ACT nasal spray Spray 1-2 sprays into both nostrils daily 16 g 3    lisinopril (ZESTRIL) 20 MG tablet Take 1 tablet (20 mg) by mouth daily 90 tablet 3    metoprolol succinate ER (TOPROL XL) 100 MG 24 hr tablet Take 1 tablet (100 mg) by mouth daily 90 tablet 3    multivitamin (CENTRUM SILVER) tablet Take 1 tablet by mouth daily      nicotine (NICODERM CQ) 14 MG/24HR 24 hr patch Place 1 patch onto the skin every 24 hours 28 patch 3    omeprazole (PRILOSEC) 40 MG DR capsule TAKE 1 CAPSULE(40 MG) BY MOUTH DAILY 30 TO 60 MINUTES BEFORE A MEAL 90 capsule 1    vitamin D3 (CHOLECALCIFEROL) 50 mcg (2000 units) tablet Take 1 tablet (50 mcg) by mouth daily 90 tablet 3    tirzepatide-Weight Management (ZEPBOUND) 2.5 MG/0.5ML prefilled pen Inject 0.5 mLs (2.5 mg) Subcutaneous every 7 days (Patient not taking: Reported on 6/17/2024) 2 mL 0    )  ( Diabetes Eval:    )    ( Pain Eval:  No Pain " (0) )    ( Wound Eval:       )    (   History   Smoking Status    Every Day    Types: Cigarettes   Smokeless Tobacco    Never    )    ( Signed By:  Beth Stanley; June 17, 2024; 10:10 AM )

## 2024-06-17 NOTE — LETTER
"2024       RE: Erlinda Aldridge  [address]  [facility] [address]     Dear Colleague,    Thank you for referring your patient, Erlinda Aldridge, to the [facility]NAG[facility] [place] [facility]. Please see a copy of my visit note below.        New Medical Weight Management Consult    PATIENT:  Erlinda Aldridge  MRN:         [id number]  :         1960  MIKAEL:         2024    Dear Alex Rosas MD,    I had the pleasure of seeing your patient, Erlinda Aldridge.  Full intake/assessment done to determine barriers to weight loss success and develop a treatment plan.  Erlinda Aldridge is a 64 year old female interested in treatment of medical problems associated with weight.  Her weight today is 355 lbs 0 oz, Body mass index is 46.84 kg/m ., and she has the following co-morbidities:      2024    10:00 AM   --   I have the following health issues associated with obesity High Blood Pressure    High Cholesterol   I have the following symptoms associated with obesity Knee Pain    Back Pain            No data to display                    2024    10:00 AM   Referring Provider   Please name the provider who referred you to Medical Weight Management  If you do not know, please answer \"I Don't Know\" i dont know       Wt Readings from Last 4 Encounters:   24 (!) 161 kg (355 lb)   04/10/24 (!) 165.7 kg (365 lb 4.8 oz)   24 (!) 166.2 kg (366 lb 6.4 oz)   24 (!) 164.6 kg (362 lb 12.8 oz)           2024    10:00 AM   Weight History   How concerned are you about your weight? Very Concerned   I became overweight As an Adult   The following factors have contributed to my weight gain Eating Wrong Types of Food    Lack of Exercise   I have tried the following methods to lose weight Other   My lowest weight since age 19 was 185   My highest weight since age 19 was 200   The most weight I have ever lost was (lbs) " 25   I have the following family history of obesity/being overweight Many of my relatives are overweight   How has your weight changed over the last year? Gained   How many pounds? 30           6/17/2024    10:00 AM   Diet Recall   Glass juice/day 3   Glass milk/day 0   Glass sugary drink/day 1   How many cans/bottles of sugar pop/soda/tea/sports drinks do you drink in a day? 2   Diet drink/day 0   Alcohol Monthly or Less   Drinks/day 1 or 2           6/17/2024    10:00 AM   Eating Habits   Generally, my meals include foods like these bread, pasta, rice, potatoes, corn, crackers, sweet dessert, pop, or juice Almost Everyday   Generally, my meals include foods like these fried meats, brats, burgers, french fries, pizza, cheese, chips, or ice cream A Few Times a Week   Eat fast food (like McDonalds, [employer], Taco Bell) A Few Times a Week   Eat at a buffet or sit-down restaurant Less Than Weekly   Eat most of my meals in front of the TV or computer Almost Everyday   Often skip meals, eat at random times, have no regular eating times Almost Everyday   Rarely sit down for a meal but snack or graze throughout Almost Everyday   Eat extra snacks between meals Almost Everyday   Eat most of my food at the end of the day Almost Everyday   Eat in the middle of the night or wake up at night to eat Less Than Weekly   Eat extra snacks to prevent or correct low blood sugar Never   Eat to prevent acid reflux or stomach pain Never   Worry about not having enough food to eat Never   I eat when I am depressed Never   I eat when I am stressed Never   I eat when I am bored Never   I eat when I am anxious Never   I eat when I am happy or as a reward Never   I feel hungry all the time even if I just have eaten Almost Everyday   Feeling full is important to me A Few Times a Week   I finish all the food on my plate even if I am already full Almost Everyday   I can't resist eating delicious food or walk past the good food/smell Less Than  Weekly   I eat/snack without noticing that I am eating Once a Week   I eat when I am preparing the meal Almost Everyday   I eat more than usual when I see others eating Less Than Weekly   I have trouble not eating sweets, ice cream, cookies, or chips if they are around the house Never   I think about food all day Less Than Weekly   What foods, if any, do you crave? Chips/Crackers           6/17/2024    10:00 AM   Activity/Exercise History   How much of a typical 12 hour day do you spend sitting? Most of the Day   How much of a typical 12 hour day do you spend lying down? Less Than Half the Day   How much of a typical day do you spend walking/standing? Most of the Day   How many hours (not including work) do you spend on the TV/Video Games/Computer/Tablet/Phone? 6 Hours or More   How many times a week are you active for the purpose of exercise? Never   What keeps you from being more active? Pain    Too tired       PAST MEDICAL HISTORY:  Past Medical History:   Diagnosis Date    DVT (deep venous thrombosis) (H) 9/2012    HTN (hypertension), benign     Thrombosis of leg     Sept 2012    Uterine fibroid            6/17/2024    10:00 AM   Work/Social History Reviewed With Patient   My employment status is Full-Time   My job is housekeeping   How much of your job is spent on the computer or phone? 100%   How many hours do you spend commuting to work daily? 30   What is your marital status? Single   Who do you live with? myself   Who does the food shopping? i do           6/17/2024    10:00 AM   Mental Health History Reviewed With Patient   Have you ever been physically or sexually abused? No   How often in the past 2 weeks have you felt little interest or pleasure in doing things? Not at all   Over the past 2 weeks how often have you felt down, depressed, or hopeless? Not at all           6/17/2024    10:00 AM   Sleep History Reviewed With Patient   How many hours do you sleep at night? 6       MEDICATIONS:   Current  "Outpatient Medications   Medication Sig Dispense Refill    acetaminophen (TYLENOL) 500 MG tablet Take 2 tablets (1,000 mg) by mouth every 6 hours as needed for mild pain 50 tablet 0    atorvastatin (LIPITOR) 40 MG tablet Take 1 tablet (40 mg) by mouth daily 90 tablet 3    chlorthalidone (HYGROTON) 25 MG tablet Take 1 tablet (25 mg) by mouth daily 90 tablet 3    COMPRESSION STOCKINGS 22-30mmHg, knee high stockings, wear as directed 2 each 1    fluticasone (FLONASE) 50 MCG/ACT nasal spray Spray 1-2 sprays into both nostrils daily 16 g 3    lisinopril (ZESTRIL) 20 MG tablet Take 1 tablet (20 mg) by mouth daily 90 tablet 3    metoprolol succinate ER (TOPROL XL) 100 MG 24 hr tablet Take 1 tablet (100 mg) by mouth daily 90 tablet 3    multivitamin (CENTRUM SILVER) tablet Take 1 tablet by mouth daily      nicotine (NICODERM CQ) 14 MG/24HR 24 hr patch Place 1 patch onto the skin every 24 hours 28 patch 3    omeprazole (PRILOSEC) 40 MG DR capsule TAKE 1 CAPSULE(40 MG) BY MOUTH DAILY 30 TO 60 MINUTES BEFORE A MEAL 90 capsule 1    vitamin D3 (CHOLECALCIFEROL) 50 mcg (2000 units) tablet Take 1 tablet (50 mcg) by mouth daily 90 tablet 3    tirzepatide-Weight Management (ZEPBOUND) 2.5 MG/0.5ML prefilled pen Inject 0.5 mLs (2.5 mg) Subcutaneous every 7 days (Patient not taking: Reported on 6/17/2024) 2 mL 0       ALLERGIES:   Allergies   Allergen Reactions    No Known Drug Allergy        PHYSICAL EXAM:  BP (!) 155/80 (BP Location: Left arm, Patient Position: Sitting, Cuff Size: Adult Large)   Pulse 72   Ht 1.854 m (6' 1\")   Wt (!) 161 kg (355 lb)   LMP 09/06/2012   SpO2 97%   BMI 46.84 kg/m     A & O x 3  HEENT: NCAT, mucous membranes moist  Respirations unlabored  Location of obesity: Mixed Obesity    ASSESSMENT:  Erlinda is a patient with mature onset class 3 obesity without significant element of familial/genetic influence and with current health consequences. She does need aggressive weight loss plan due to severe " weight, metabolic syndrome, hypertension, hyperlipidemia.      Erlinda Aldridge eats a high carb diet, eats fast food once or more per week, uses food as mood management, and has a disorganized meal pattern.    Her problem is complicated by strong craving/reward pathways    Her ability to lose weight is impacted by lack of confidence.    PLAN:    Meal planning - focus on no between meal snacking, aggressive lowering of starches and cheese    Craving/Reward   Ancillary testing:  N/A.  Food Plan:  focus on no between meal snacking, aggressive lowering of starches and cheese.   Activity Plan:  Activity journal.  Supplementary:  N/A.   Medication:  The patient will begin medication in pursuit of improved medical status as influenced by body weight. She will start Wegovy or Zepbound through [facility] Pharmacist ([employer] employee).  There is a mutual understanding of the goals and risks of this therapy. The patient is in agreement. She is educated on dosage regimen and possible side effects.    RTC:    12 weeks.  I spent 45 minutes with this patient face to face and explained the conditions and plans (more than 50% of time was counseling/coordination of weight management).    Sincerely,    Joe Calderon MD

## 2024-06-17 NOTE — PATIENT INSTRUCTIONS
Patient needs to see MTM pharmacist for [employer] employee insurance to get started on GLP1. Please schedule with Ann Nagle, Lauren Bloch or Kamryn Saha 60 minutes, first available.

## 2024-06-17 NOTE — TELEPHONE ENCOUNTER
Patient confirmed scheduled appointment:  Date: 6/20/24  Time: 3 pm  Visit type: NEW MTM  Provider: Jorge Peñaloza  Location: virtual  Testing/imaging: n/a  Additional notes: n/a

## 2024-06-20 ENCOUNTER — TELEPHONE (OUTPATIENT)
Dept: SURGERY | Facility: CLINIC | Age: 64
End: 2024-06-20
Payer: COMMERCIAL

## 2024-06-20 ENCOUNTER — VIRTUAL VISIT (OUTPATIENT)
Dept: CARDIOLOGY | Facility: CLINIC | Age: 64
End: 2024-06-20
Attending: INTERNAL MEDICINE
Payer: COMMERCIAL

## 2024-06-20 VITALS — BODY MASS INDEX: 39.68 KG/M2 | WEIGHT: 293 LBS | HEIGHT: 72 IN

## 2024-06-20 DIAGNOSIS — E66.01 MORBID OBESITY (H): Primary | ICD-10-CM

## 2024-06-20 NOTE — PROGRESS NOTES
"Virtual Visit Details    Type of service:  Video Visit     Originating Location (pt. Location): {video visit patient location:236480::\"Home\"}  {PROVIDER LOCATION On-site should be selected for visits conducted from your clinic location or adjoining [facility] hospital, academic office, or other nearby [facility] building. Off-site should be selected for all other provider locations, including home:29[address]31}  Distant Location (provider location):  {virtual location provider:185305}  Platform used for Video Visit: {Virtual Visit Platforms:975517::\"Tame\"}        "

## 2024-06-20 NOTE — PATIENT INSTRUCTIONS
Recommendations from today's MTM visit:                                                    MTM (medication therapy management) is a service provided by a clinical pharmacist designed to help you get the most of out of your medicines.      Zepbound Dosing:   Start Zepbound: It is a subcutaneous injection that you inject once weekly and titrate the dose slowly over time. Make sure inject the same time each day of the week, for example Monday evenings.  Each pen is single use.  In each prescription, you will get 4 pens in each box.  You will need a new prescription for each strength of the medication.  Week 1-4: Inject 2.5 mg once weekly  Week 5-8: If tolerating, can increase to 5 mg once weekly if necessary  Week 9-12: If tolerating, can increase to 7.5 mg once weekly if necessary     The goal is to get to a dose that is well tolerated and effective for you. You do not have to go up on the dose each month or get to maximum dose if getting an effective response with minimal side effects.      *If you are having some nausea or other side effects to where you are hesitant to move up to the next dose, stay at the same dose you are on for an additional week to see if side effect(s) improves/resolves. Make sure to take this time to hydrate and ensure you are drinking at least 64 oz water per day. If you are wanting to stay at the same dose and do not have additional refills on that prescription please reach out to the clinic.     Zepbound Administration Video: Video that was created by the .  https://www.zepbound.[employer]com/how-to-use     Zepbound Copay Card:  https://www.zepbound.[employer]com/coverage-savings     Zepbound Storage and Stability:   Make sure that when you get the prescription that you store the prescription in the refrigerator until it is time to use the Zepbound pen.  Once it is time to use the Zepbound pen, you can keep the pen at room temperature and it is good for up to 21 days at room  "temperature.      Zepbound Common Side Effects:   Nausea, diarrhea, constipation, headache, tiredness (fatigue), dizziness, stomach upset/pain. Less commonly, Zepbound can cause low blood sugar (symptoms: shaky, dizzy, sweaty, agitation). Please reach out to the care team should you feel like this is occurring. It is important to ensure that you are eating consistent meals and not skipping meals. Ensure you are getting at least 64 oz water daily.       Follow-up with Blank Xavier PharmD as scheduled in September.    It was great speaking with you today.  I value your experience and would be very thankful for your time in providing feedback in our clinic survey. In the next few days, you may receive an email or text message from [employer] with a link to a survey related to your  clinical pharmacist.\"     To schedule another MTM appointment, please call the clinic directly or you may call the MTM scheduling line at [phone].    My voicemail: [phone]    My Clinical Pharmacist's contact information:                                                      Please feel free to contact me with any questions or concerns you have.      Jorge Peñaloza PharmD, BCACP  Medication Therapy Management Pharmacist  [facility]    "

## 2024-06-20 NOTE — PROGRESS NOTES
Medication Therapy Management (MTM) Encounter    ASSESSMENT:                            Medication Adherence/Access: No issues identified    Weight management: Weight unchanged.  Appropriate candidate for initiation of GLP-1 agonist therapy, pretreatment BMI greater than 40 kg/m .  Baseline acid reflux is well-managed with use of omeprazole, constipation is mild and self-limited, use of bulk forming laxative if needed. Negative history of pancreatitis, medullary thyroid cancer and multiple endocrine neoplasia type 2.  Advise initiation of Zepbound given supply barriers of Wegovy at initial titration dose.  Discussed potential cost barriers if she has a high deductible plan, she was unsure.  Reviewed mechanism, administration, monitoring, safety, adverse effects with use of Zepbound.    For patients that are under [employer] insurance coverage, it is mandated by insurance that each qualifying patient meet with hospital based Weight Management Medication Therapy Management pharmacist to continue therapy coverage. The following patient meets the below coverage criteria and can therefore continue GLP-1/GIP agonist therapy for Weight Management:    Adult  BMI >40 with or without comorbidities   OR   BMI >30 + NAFLD*   at time of initiating GLP-1/GIP agonist therapy Approved for 29 weeks  Met Updated Initial Criteria   At least 5% weight loss of baseline body weight  Approved for 12 months        PLAN:                            Zepbound Dosing:   Start Zepbound: It is a subcutaneous injection that you inject once weekly and titrate the dose slowly over time. Make sure inject the same time each day of the week, for example Monday evenings.  Each pen is single use.  In each prescription, you will get 4 pens in each box.  You will need a new prescription for each strength of the medication.  Week 1-4: Inject 2.5 mg once weekly  Week 5-8: If tolerating, can increase to 5 mg once weekly if necessary  Week  9-12: If tolerating, can increase to 7.5 mg once weekly if necessary    The goal is to get to a dose that is well tolerated and effective for you. You do not have to go up on the dose each month or get to maximum dose if getting an effective response with minimal side effects.     *If you are having some nausea or other side effects to where you are hesitant to move up to the next dose, stay at the same dose you are on for an additional week to see if side effect(s) improves/resolves. Make sure to take this time to hydrate and ensure you are drinking at least 64 oz water per day. If you are wanting to stay at the same dose and do not have additional refills on that prescription please reach out to the clinic.    Zepbound Administration Video: Video that was created by the .  https://www.[employer]/how-to-use    Zepbound Copay Card:  https://www.[employer]/coverage-savings    Zepbound Storage and Stability:   Make sure that when you get the prescription that you store the prescription in the refrigerator until it is time to use the Zepbound pen.  Once it is time to use the Zepbound pen, you can keep the pen at room temperature and it is good for up to 21 days at room temperature.     Zepbound Common Side Effects:   Nausea, diarrhea, constipation, headache, tiredness (fatigue), dizziness, stomach upset/pain. Less commonly, Zepbound can cause low blood sugar (symptoms: shaky, dizzy, sweaty, agitation). Please reach out to the care team should you feel like this is occurring. It is important to ensure that you are eating consistent meals and not skipping meals. Ensure you are getting at least 64 oz water daily.      Follow-up with Blank Xavier PharmD as scheduled in September.    SUBJECTIVE/OBJECTIVE:                          Molly Aldridge is a 64 year old female contacted via secure video for an initial visit. She was referred to me from Dr. Calderon .      Reason for visit: [facility] insurance  "requirements, GLP-1 agonist consult.    Allergies/ADRs: Reviewed in chart  Past Medical History: Reviewed in chart  Tobacco: She reports that she has been smoking cigarettes. She has never used smokeless tobacco.Nicotine/Tobacco Cessation Plan  Information offered: Patient not interested at this time      Medication Adherence/Access: no issues reported    Weight management:  Video consult to discuss initiation of GLP-1/GIP agonist therapy.  Patient states she has been attempting to initiate weight loss medication since early in the year and has had a number of barriers from an insurance standpoint.  She has goals of improvement in global health and gradual weight loss.  She has been making attempted dietary modifications and limiting intake of fast food.      Fluid/Water intake: Tries to drink a large glass of ice water once daily   Diet: Notes she is a picky eater, trying to limit snacking, avoiding fast food  Physical activity: Walking with work     Medical History:  MEN2/Medullary Thyroid Cancer: Negative   Pancreatitis: Negative   Baseline GI symptomatology: Baseline acid reflux. Does use metamucil qam.  Also uses a 15 day cleanse product \"15 day cleanse gut support\" if needed for constipation.     Wt Readings from Last 4 Encounters:   06/20/24 (!) 161 kg (355 lb)   06/17/24 (!) 161 kg (355 lb)   04/10/24 (!) 165.7 kg (365 lb 4.8 oz)   03/11/24 (!) 166.2 kg (366 lb 6.4 oz)     Body Mass Index (BMI) Body mass index is 46.85 kg/m .    Today's Vitals: Ht 1.854 m (6' 0.99\")   Wt (!) 161 kg (355 lb)   LMP 09/06/2012   BMI 46.85 kg/m      Lab Results   Component Value Date    A1C 6.1 11/16/2023    A1C 6.1 02/08/2022    A1C 6.1 03/22/2021    A1C 5.9 06/10/2014       ----------------      I spent 48 minutes with this patient today. All changes were made via collaborative practice agreement with Kary Cadet. A copy of the visit note was provided to the patient's provider(s).    A summary of these " recommendations was sent via [employer].    Jorge Peñaloza, PharmD, BCACP  Medication Therapy Management Pharmacist  [facility]     Telemedicine Visit Details  Type of service:  Dominic  Joined the call at 6/20/2024, 3:08:08 pm.  Left the call at 6/20/2024, 3:56:25 pm.  You were on the call for 48 minutes 16 seconds .     Medication Therapy Recommendations  No medication therapy recommendations to display

## 2024-06-20 NOTE — NURSING NOTE
Is the patient currently in the state of [place]? YES    Visit mode:VIDEO    If the visit is dropped, the patient can be reconnected by: VIDEO VISIT: Text to cell phone:   Telephone Information:   Mobile [phone]       Will anyone else be joining the visit? NO  (If patient encounters technical issues they should call [phone])    How would you like to obtain your AVS? MyChart    Are changes needed to the allergy or medication list? No    Are refills needed on medications prescribed by this physician? NO    Reason for visit: Consult    Beverly DING

## 2024-06-20 NOTE — Clinical Note
6/20/2024      RE: Erlinda Aldridge  [address]  [facility] [address]       Dear Colleague,    Thank you for the opportunity to participate in the care of your patient, Erlinda Aldridge, at the [facility] at [facility]. Please see a copy of my visit note below.    Medication Therapy Management (MTM) Encounter    ASSESSMENT:                            Medication Adherence/Access: No issues identified    Weight management: Weight unchanged.  Appropriate candidate for initiation of GLP-1 agonist therapy, pretreatment BMI greater than 40 kg/m .  Baseline acid reflux is well-managed with use of omeprazole, constipation is mild and self-limited, use of bulk forming laxative if needed. Negative history of pancreatitis, medullary thyroid cancer and multiple endocrine neoplasia type 2.  Advise initiation of Zepbound given supply barriers of [place] at initial titration dose.  Discussed potential cost barriers if she has a high deductible plan, she was unsure.  Reviewed mechanism, administration, monitoring, safety, adverse effects with use of Zepbound.    For patients that are under [employer] insurance coverage, it is mandated by insurance that each qualifying patient meet with hospital based Weight Management Medication Therapy Management pharmacist to continue therapy coverage. The following patient meets the below coverage criteria and can therefore continue GLP-1/GIP agonist therapy for Weight Management:    Adult  BMI >40 with or without comorbidities   OR   BMI >30 + NAFLD*   at time of initiating GLP-1/GIP agonist therapy Approved for 29 weeks  Met Updated Initial Criteria   At least 5% weight loss of baseline body weight  Approved for 12 months        PLAN:                            Zepbound Dosing:   Start Zepbound: It is a subcutaneous injection that you inject once weekly and titrate the dose slowly over time.  Make sure inject the same time each day of the week, for example Monday evenings.  Each pen is single use.  In each prescription, you will get 4 pens in each box.  You will need a new prescription for each strength of the medication.  Week 1-4: Inject 2.5 mg once weekly  Week 5-8: If tolerating, can increase to 5 mg once weekly if necessary  Week 9-12: If tolerating, can increase to 7.5 mg once weekly if necessary    The goal is to get to a dose that is well tolerated and effective for you. You do not have to go up on the dose each month or get to maximum dose if getting an effective response with minimal side effects.     *If you are having some nausea or other side effects to where you are hesitant to move up to the next dose, stay at the same dose you are on for an additional week to see if side effect(s) improves/resolves. Make sure to take this time to hydrate and ensure you are drinking at least 64 oz water per day. If you are wanting to stay at the same dose and do not have additional refills on that prescription please reach out to the clinic.    Zepbound Administration Video: Video that was created by the .  https://www.zepbound.HipChat[employer]"/how-to-use    Zepbound Copay Card:  https://www.zepbCitizenShipper.HipChat.com/coverage-savings    Zepbound Storage and Stability:   Make sure that when you get the prescription that you store the prescription in the refrigerator until it is time to use the Zepbound pen.  Once it is time to use the Zepbound pen, you can keep the pen at room temperature and it is good for up to 21 days at room temperature.     Zepbound Common Side Effects:   Nausea, diarrhea, constipation, headache, tiredness (fatigue), dizziness, stomach upset/pain. Less commonly, Zepbound can cause low blood sugar (symptoms: shaky, dizzy, sweaty, agitation). Please reach out to the care team should you feel like this is occurring. It is important to ensure that you are eating consistent meals and not  "skipping meals. Ensure you are getting at least 64 oz water daily.      Follow-up with Blank Xavier PharmD as scheduled in September.    SUBJECTIVE/OBJECTIVE:                          Molly Aldridge is a 64 year old female contacted via secure video for an initial visit. She was referred to me from Dr. Calderon .      Reason for visit: R insurance requirements, GLP-1 agonist consult.    Allergies/ADRs: Reviewed in chart  Past Medical History: Reviewed in chart  Tobacco: She reports that she has been smoking cigarettes. She has never used smokeless tobacco.Nicotine/Tobacco Cessation Plan  Information offered: Patient not interested at this time      Medication Adherence/Access: no issues reported    Weight management:  Video consult to discuss initiation of GLP-1/GIP agonist therapy.  Patient states she has been attempting to initiate weight loss medication since early in the year and has had a number of barriers from an insurance standpoint.  She has goals of improvement in global health and gradual weight loss.  She has been making attempted dietary modifications and limiting intake of fast food.      Fluid/Water intake: Tries to drink a large glass of ice water once daily   Diet: Notes she is a picky eater, trying to limit snacking, avoiding fast food  Physical activity: Walking with work     Medical History:  MEN2/Medullary Thyroid Cancer: Negative   Pancreatitis: Negative   Baseline GI symptomatology: Baseline acid reflux. Does use metamucil qam.  Also uses a 15 day cleanse product \"15 day cleanse gut support\" if needed for constipation.     Wt Readings from Last 4 Encounters:   06/20/24 (!) 161 kg (355 lb)   06/17/24 (!) 161 kg (355 lb)   04/10/24 (!) 165.7 kg (365 lb 4.8 oz)   03/11/24 (!) 166.2 kg (366 lb 6.4 oz)     Body Mass Index (BMI) Body mass index is 46.85 kg/m .    Today's Vitals: Ht 1.854 m (6' 0.99\")   Wt (!) 161 kg (355 lb)   LMP 09/06/2012   BMI 46.85 kg/m      Lab Results   Component Value " Date    A1C 6.1 11/16/2023    A1C 6.1 02/08/2022    A1C 6.1 03/22/2021    A1C 5.9 06/10/2014       ----------------      I spent 48 minutes with this patient today. All changes were made via collaborative practice agreement with Kary Cadet. A copy of the visit note was provided to the patient's provider(s).    A summary of these recommendations was sent via [employer].    Jorge Peñaloza, PharmD, BCACP  Medication Therapy Management Pharmacist  [facility]     Telemedicine Visit Details  Type of service:  Dominic  Joined the call at 6/20/2024, 3:08:08 pm.  Left the call at 6/20/2024, 3:56:25 pm.  You were on the call for 48 minutes 16 seconds .     Medication Therapy Recommendations  No medication therapy recommendations to display           Please do not hesitate to contact me if you have any questions/concerns.     Sincerely,     JORGE PEÑALOZA [facility]

## 2024-06-21 ENCOUNTER — TELEPHONE (OUTPATIENT)
Dept: SURGERY | Facility: CLINIC | Age: 64
End: 2024-06-21
Payer: COMMERCIAL

## 2024-06-21 NOTE — TELEPHONE ENCOUNTER
PA Initiation    Medication: ZEPBOUND 2.5 MG/0.5ML SC SOAJ  Insurance Company: [employer] - Phone [phone] Fax [phone]  Pharmacy Filling the Rx: [facility] - [place], [place] - [facility] [address]  Filling Pharmacy Phone: [phone]  Filling Pharmacy Fax: [phone]  Start Date: 6/21/2024     Key: [id number]

## 2024-06-24 NOTE — TELEPHONE ENCOUNTER
Prior Authorization Approval    Medication: ZEPBOUND 2.5 MG/0.5ML SC SOAJ  Authorization Effective Date: 6/23/2024  Authorization Expiration Date: 12/20/2024  Approved Dose/Quantity: uud   Reference #: Key: [id number]   Insurance Company: [employer] - Phone [phone] Fax [phone]  Expected CoPay: $    CoPay Card Available:      Financial Assistance Needed:    Which Pharmacy is filling the prescription: [facility] - [place], [place] - [address]  Pharmacy Notified: Yes

## 2024-09-13 ENCOUNTER — TELEPHONE (OUTPATIENT)
Dept: ENDOCRINOLOGY | Facility: CLINIC | Age: 64
End: 2024-09-13
Payer: COMMERCIAL

## 2024-09-13 NOTE — TELEPHONE ENCOUNTER
MTM appointment cancelled, we made one more attempt to reschedule.     Routing back to referring provider and MTM Pharmacist Team    [facility]

## 2024-10-16 ENCOUNTER — MYC REFILL (OUTPATIENT)
Dept: FAMILY MEDICINE | Facility: CLINIC | Age: 64
End: 2024-10-16
Payer: COMMERCIAL

## 2024-10-16 DIAGNOSIS — K21.9 CHRONIC GERD: ICD-10-CM

## 2024-10-16 RX ORDER — OMEPRAZOLE 40 MG/1
CAPSULE, DELAYED RELEASE ORAL
Qty: 90 CAPSULE | Refills: 1 | Status: SHIPPED | OUTPATIENT
Start: 2024-10-16

## 2024-10-16 NOTE — TELEPHONE ENCOUNTER
"Request for medication refill:  omeprazole (PRILOSEC) 40 MG DR capsule     Providers if patient needs an appointment and you are willing to give a one month supply please refill for one month and  send a letter/MyChart using \".SMILLIMITEDREFILL\" .smillimited and route chart to \"[facility] \" (Giving one month refill in non controlled medications is strongly recommended before denial)    If refill has been denied, meaning absolutely no refills without visit, please complete the smart phrase \".smirxrefuse\" and route it to the \"P [facility] MED REFILLS\"  pool to inform the patient and the pharmacy.    Gilberto Frost, CMA      "

## 2024-12-09 ENCOUNTER — VIRTUAL VISIT (OUTPATIENT)
Dept: ENDOCRINOLOGY | Facility: CLINIC | Age: 64
End: 2024-12-09
Payer: COMMERCIAL

## 2024-12-09 DIAGNOSIS — E66.01 MORBID OBESITY (H): Primary | ICD-10-CM

## 2024-12-09 PROCEDURE — 99213 OFFICE O/P EST LOW 20 MIN: CPT | Mod: 95 | Performed by: INTERNAL MEDICINE

## 2024-12-09 NOTE — NURSING NOTE
Is the patient currently in the state of [place]? YES    Location: home    Visit mode:VIDEO    If the visit is dropped, the patient can be reconnected by: VIDEO VISIT: Text to cell phone:   Telephone Information:   Mobile [phone]       Will anyone else be joining the visit? NO  (If patient encounters technical issues they should call [phone]    Are changes needed to the allergy or medication list? No    Are refills needed on medications prescribed by this physician? NO    Reason for visit: BERNARD Ramirez, Virtual Visit Facilitator    QNR Status: Pt declined qnr

## 2024-12-09 NOTE — PROGRESS NOTES
Return Medical Weight Management Note     Erlinda Aldridge  MRN:  [id number]  :  1960  MIKAEL:  2024    Dear Alex Rosas MD,    I had the pleasure of seeing your patient Erlinda Aldridge.  She is a 64 year old female who I am continuing to see for treatment of obesity related to:      2024    10:00 AM   --   I have the following health issues associated with obesity High Blood Pressure    High Cholesterol   I have the following symptoms associated with obesity Knee Pain    Back Pain     CURRENT WEIGHT:   0 lbs 0 oz 343 a few months ago    Wt Readings from Last 4 Encounters:   24 (!) 161 kg (355 lb)   24 (!) 161 kg (355 lb)   04/10/24 (!) 165.7 kg (365 lb 4.8 oz)   24 (!) 166.2 kg (366 lb 6.4 oz)     Height:  Data Unavailable  Body Mass Index:  There is no height or weight on file to calculate BMI.  Vitals:  B/P: Data Unavailable, P: Data Unavailable    Initial consult weight was 355 on 24.  Weight change since last seen on 24 is down 0 pounds.   Total loss is 0 pounds.    INTERVAL HISTORY:  Taking and tolerating zepbound 7.5 mg/w and feels has been very helpful based on lowered appetite and looser clothes. Has not weighed for months.         2024    10:00 AM   Diet Recall Review with Patient   If you do eat breakfast, what types of food do you eat? monroy eggs toast with grape jelly and juice   If you do eat supper, what types of food do you typically eat? sometime its hit and miss   If you do snack, what types of food do you typically eat? chips maybe soad or some water   How many glasses of juice do you drink in a typical day? 3   How many of glasses of milk do you drink in a typical day? 0   How many 8oz glasses of sugar containing drinks such as Abrahan-Aid/sweet tea do you drink in a day? 1   How many cans/bottles of sugar pop/soda/tea/sports drinks do you drink in a day? 2   How many cans/bottles of diet pop/soda/tea or sports drink do you drink in a day? 0    How often do you have a drink of alcohol? Monthly or Less   If you do drink, how many drinks might you have in a day? 1 or 2     MEDICATIONS:   Current Outpatient Medications   Medication Sig Dispense Refill    acetaminophen (TYLENOL) 500 MG tablet Take 2 tablets (1,000 mg) by mouth every 6 hours as needed for mild pain 50 tablet 0    atorvastatin (LIPITOR) 40 MG tablet Take 1 tablet (40 mg) by mouth daily 90 tablet 3    chlorthalidone (HYGROTON) 25 MG tablet Take 1 tablet (25 mg) by mouth daily 90 tablet 3    COMPRESSION STOCKINGS 22-30mmHg, knee high stockings, wear as directed 2 each 1    fluticasone (FLONASE) 50 MCG/ACT nasal spray Spray 1-2 sprays into both nostrils daily 16 g 3    lisinopril (ZESTRIL) 20 MG tablet Take 1 tablet (20 mg) by mouth daily 90 tablet 3    metoprolol succinate ER (TOPROL XL) 100 MG 24 hr tablet Take 1 tablet (100 mg) by mouth daily 90 tablet 3    multivitamin (CENTRUM SILVER) tablet Take 1 tablet by mouth daily      nicotine (NICODERM CQ) 14 MG/24HR 24 hr patch Place 1 patch onto the skin every 24 hours 28 patch 3    omeprazole (PRILOSEC) 40 MG DR capsule TAKE 1 CAPSULE(40 MG) BY MOUTH DAILY 30 TO 60 MINUTES BEFORE A MEAL 90 capsule 1    tirzepatide-Weight Management (ZEPBOUND) 2.5 MG/0.5ML prefilled pen Inject 0.5 mLs (2.5 mg) Subcutaneous every 7 days 2 mL 1    tirzepatide-Weight Management (ZEPBOUND) 5 MG/0.5ML prefilled pen Inject 0.5 mLs (5 mg) Subcutaneous every 7 days 2 mL 1    tirzepatide-Weight Management (ZEPBOUND) 7.5 MG/0.5ML prefilled pen Inject 0.5 mLs (7.5 mg) Subcutaneous every 7 days 2 mL 1    vitamin D3 (CHOLECALCIFEROL) 50 mcg (2000 units) tablet Take 1 tablet (50 mcg) by mouth daily 90 tablet 3     Current Facility-Administered Medications   Medication Dose Route Frequency Provider Last Rate Last Admin    betamethasone acet & sod phos (CELESTONE) injection 12 mg  12 mg   Alexey Dean MD   12 mg at 09/08/20 1510    ropivacaine (NAROPIN) injection 3 mL   3 mL   Alexey Dean MD   3 mL at 09/08/20 1510          No data to display              ASSESSMENT:   Zepbound appears to be very good for her but she will lose this treatment in January so that [employer] can keep more money. Likely will go with phentermine and perhaps topiramate after January based on her budget.    FOLLOW-UP:    12 weeks.    Sincerely,  Joe Calderon MD

## 2024-12-09 NOTE — LETTER
2024       RE: Erlinda Aldridge  [address]  [facility] [address]     Dear Colleague,    Thank you for referring your patient, Erlinda Aldridge, to the [facility] MANAG[facility] CLINIC [place] at [facility]. Please see a copy of my visit note below.    Virtual Visit Details    Joined the call at 2024, 10:36:45 am.  Left the call at 2024, 10:43:50 am.    Originating Location (pt. Location): Home    Distant Location (provider location):  Off-site  Platform used for Video Visit: [facility] Medical Weight Management Note     Erlinda Aldridge  MRN:  [id number]  :  1960  MIKAEL:  2024    Dear Alex Rosas MD,    I had the pleasure of seeing your patient Erlinda Aldridge.  She is a 64 year old female who I am continuing to see for treatment of obesity related to:      2024    10:00 AM   --   I have the following health issues associated with obesity High Blood Pressure    High Cholesterol   I have the following symptoms associated with obesity Knee Pain    Back Pain     CURRENT WEIGHT:   0 lbs 0 oz 343 a few months ago    Wt Readings from Last 4 Encounters:   24 (!) 161 kg (355 lb)   24 (!) 161 kg (355 lb)   04/10/24 (!) 165.7 kg (365 lb 4.8 oz)   24 (!) 166.2 kg (366 lb 6.4 oz)     Height:  Data Unavailable  Body Mass Index:  There is no height or weight on file to calculate BMI.  Vitals:  B/P: Data Unavailable, P: Data Unavailable    Initial consult weight was 355 on 24.  Weight change since last seen on 24 is down 0 pounds.   Total loss is 0 pounds.    INTERVAL HISTORY:  Taking and tolerating zepbound 7.5 mg/w and feels has been very helpful based on lowered appetite and looser clothes. Has not weighed for months.         2024    10:00 AM   Diet Recall Review with Patient   If you do eat breakfast, what types of food do you eat? monroy eggs toast with grape jelly and juice   If you  do eat supper, what types of food do you typically eat? sometime its hit and miss   If you do snack, what types of food do you typically eat? chips maybe soad or some water   How many glasses of juice do you drink in a typical day? 3   How many of glasses of milk do you drink in a typical day? 0   How many 8oz glasses of sugar containing drinks such as Abrahan-Aid/sweet tea do you drink in a day? 1   How many cans/bottles of sugar pop/soda/tea/sports drinks do you drink in a day? 2   How many cans/bottles of diet pop/soda/tea or sports drink do you drink in a day? 0   How often do you have a drink of alcohol? Monthly or Less   If you do drink, how many drinks might you have in a day? 1 or 2     MEDICATIONS:   Current Outpatient Medications   Medication Sig Dispense Refill     acetaminophen (TYLENOL) 500 MG tablet Take 2 tablets (1,000 mg) by mouth every 6 hours as needed for mild pain 50 tablet 0     atorvastatin (LIPITOR) 40 MG tablet Take 1 tablet (40 mg) by mouth daily 90 tablet 3     chlorthalidone (HYGROTON) 25 MG tablet Take 1 tablet (25 mg) by mouth daily 90 tablet 3     COMPRESSION STOCKINGS 22-30mmHg, knee high stockings, wear as directed 2 each 1     fluticasone (FLONASE) 50 MCG/ACT nasal spray Spray 1-2 sprays into both nostrils daily 16 g 3     lisinopril (ZESTRIL) 20 MG tablet Take 1 tablet (20 mg) by mouth daily 90 tablet 3     metoprolol succinate ER (TOPROL XL) 100 MG 24 hr tablet Take 1 tablet (100 mg) by mouth daily 90 tablet 3     multivitamin (CENTRUM SILVER) tablet Take 1 tablet by mouth daily       nicotine (NICODERM CQ) 14 MG/24HR 24 hr patch Place 1 patch onto the skin every 24 hours 28 patch 3     omeprazole (PRILOSEC) 40 MG DR capsule TAKE 1 CAPSULE(40 MG) BY MOUTH DAILY 30 TO 60 MINUTES BEFORE A MEAL 90 capsule 1     tirzepatide-Weight Management (ZEPBOUND) 2.5 MG/0.5ML prefilled pen Inject 0.5 mLs (2.5 mg) Subcutaneous every 7 days 2 mL 1     tirzepatide-Weight Management (ZEPBOUND) 5  MG/0.5ML prefilled pen Inject 0.5 mLs (5 mg) Subcutaneous every 7 days 2 mL 1     tirzepatide-Weight Management (ZEPBOUND) 7.5 MG/0.5ML prefilled pen Inject 0.5 mLs (7.5 mg) Subcutaneous every 7 days 2 mL 1     vitamin D3 (CHOLECALCIFEROL) 50 mcg (2000 units) tablet Take 1 tablet (50 mcg) by mouth daily 90 tablet 3     Current Facility-Administered Medications   Medication Dose Route Frequency Provider Last Rate Last Admin     betamethasone acet & sod phos (CELESTONE) injection 12 mg  12 mg   Alexey Dean MD   12 mg at 09/08/20 1510     ropivacaine (NAROPIN) injection 3 mL  3 mL   Alexey Dean MD   3 mL at 09/08/20 1510          No data to display              ASSESSMENT:   Zepbound appears to be very good for her but she will lose this treatment in January so that [place] can keep more money. Likely will go with phentermine and perhaps topiramate after January based on her budget.    FOLLOW-UP:    12 weeks.    Sincerely,  Joe Calderon MD      Again, thank you for allowing me to participate in the care of your patient.      Sincerely,    Joe Calderon MD

## 2024-12-09 NOTE — PROGRESS NOTES
Virtual Visit Details    Joined the call at 12/9/2024, 10:36:45 am.  Left the call at 12/9/2024, 10:43:50 am.    Originating Location (pt. Location): Home    Distant Location (provider location):  Off-site  Platform used for Video Visit: [employer]

## 2024-12-17 ENCOUNTER — TELEPHONE (OUTPATIENT)
Dept: ENDOCRINOLOGY | Facility: CLINIC | Age: 64
End: 2024-12-17
Payer: COMMERCIAL

## 2024-12-17 NOTE — TELEPHONE ENCOUNTER
Left Voicemail (1st Attempt) and Sent [employer] (1st Attempt) for the patient to call back and schedule the following:    Appointment type: Return Weight Management  Appointment mode: Virtual Visit  Provider: Dr. Joe Calderon  Return date: Approx. April 2025  Specialty phone number: [phone]    Additional Notes:

## 2025-03-07 ENCOUNTER — MYC REFILL (OUTPATIENT)
Dept: FAMILY MEDICINE | Facility: CLINIC | Age: 65
End: 2025-03-07
Payer: COMMERCIAL

## 2025-03-07 DIAGNOSIS — E78.5 HYPERLIPIDEMIA WITH TARGET LDL LESS THAN 130: ICD-10-CM

## 2025-03-07 DIAGNOSIS — K21.9 CHRONIC GERD: ICD-10-CM

## 2025-03-07 DIAGNOSIS — I10 HTN (HYPERTENSION), BENIGN: Chronic | ICD-10-CM

## 2025-03-10 RX ORDER — LISINOPRIL 20 MG/1
20 TABLET ORAL DAILY
Qty: 90 TABLET | Refills: 0 | Status: SHIPPED | OUTPATIENT
Start: 2025-03-10

## 2025-03-10 RX ORDER — OMEPRAZOLE 40 MG/1
CAPSULE, DELAYED RELEASE ORAL
Qty: 90 CAPSULE | Refills: 0 | Status: SHIPPED | OUTPATIENT
Start: 2025-03-10

## 2025-03-10 RX ORDER — METOPROLOL SUCCINATE 100 MG/1
100 TABLET, EXTENDED RELEASE ORAL DAILY
Qty: 90 TABLET | Refills: 0 | Status: SHIPPED | OUTPATIENT
Start: 2025-03-10

## 2025-03-10 RX ORDER — CHLORTHALIDONE 25 MG/1
25 TABLET ORAL DAILY
Qty: 90 TABLET | Refills: 0 | Status: SHIPPED | OUTPATIENT
Start: 2025-03-10

## 2025-03-10 RX ORDER — ATORVASTATIN CALCIUM 40 MG/1
40 TABLET, FILM COATED ORAL DAILY
Qty: 90 TABLET | Refills: 0 | Status: SHIPPED | OUTPATIENT
Start: 2025-03-10

## 2025-03-10 NOTE — TELEPHONE ENCOUNTER
"Last seen 4/10/24    Request for medication refill:    Medication Name:     atorvastatin (LIPITOR) 40 MG tablet     chlorthalidone (HYGROTON) 25 MG tablet     lisinopril (ZESTRIL) 20 MG tablet   metoprolol succinate ER (TOPROL XL) 100 MG 24 hr tablet  omeprazole (PRILOSEC) 40 MG DR capsule     Providers if patient needs an appointment and you are willing to give a one month supply please refill for one month and  send a MyChart using \".SMILLIMITEDREFILL\" .Or route chart to \"P [facility] \" . To call patient and inform of limited refill and providers request to make an appointment. (Giving one month refill in non controlled medications is strongly recommended before denial)    If refill has been denied, meaning absolutely no refills without visit, please complete the smart phrase \".SMIRXREFUSE\" and route it to the \"P [facility] MED REFILLS\"  pool to inform the patient and the pharmacy.    Marce Walls RN      "

## 2025-04-15 ENCOUNTER — OFFICE VISIT (OUTPATIENT)
Dept: FAMILY MEDICINE | Facility: CLINIC | Age: 65
End: 2025-04-15
Payer: COMMERCIAL

## 2025-04-15 VITALS
BODY MASS INDEX: 39.68 KG/M2 | OXYGEN SATURATION: 100 % | HEART RATE: 83 BPM | RESPIRATION RATE: 16 BRPM | DIASTOLIC BLOOD PRESSURE: 85 MMHG | HEIGHT: 72 IN | WEIGHT: 293 LBS | TEMPERATURE: 97.6 F | SYSTOLIC BLOOD PRESSURE: 128 MMHG

## 2025-04-15 DIAGNOSIS — Z11.4 SCREENING FOR HIV (HUMAN IMMUNODEFICIENCY VIRUS): ICD-10-CM

## 2025-04-15 DIAGNOSIS — J30.1 SEASONAL ALLERGIC RHINITIS DUE TO POLLEN: ICD-10-CM

## 2025-04-15 DIAGNOSIS — Z13.6 SCREENING FOR CARDIOVASCULAR CONDITION: ICD-10-CM

## 2025-04-15 DIAGNOSIS — I10 HTN (HYPERTENSION), BENIGN: Primary | ICD-10-CM

## 2025-04-15 DIAGNOSIS — E78.5 HYPERLIPIDEMIA WITH TARGET LDL LESS THAN 130: ICD-10-CM

## 2025-04-15 DIAGNOSIS — F17.200 NICOTINE DEPENDENCE, UNCOMPLICATED, UNSPECIFIED NICOTINE PRODUCT TYPE: ICD-10-CM

## 2025-04-15 DIAGNOSIS — K21.9 CHRONIC GERD: ICD-10-CM

## 2025-04-15 LAB
ANION GAP SERPL CALCULATED.3IONS-SCNC: 12 MMOL/L (ref 7–15)
BUN SERPL-MCNC: 14.2 MG/DL (ref 8–23)
CALCIUM SERPL-MCNC: 10.2 MG/DL (ref 8.8–10.4)
CHLORIDE SERPL-SCNC: 97 MMOL/L (ref 98–107)
CHOLEST SERPL-MCNC: 163 MG/DL
CREAT SERPL-MCNC: 0.8 MG/DL (ref 0.51–0.95)
EGFRCR SERPLBLD CKD-EPI 2021: 82 ML/MIN/1.73M2
ERYTHROCYTE [DISTWIDTH] IN BLOOD BY AUTOMATED COUNT: 13.4 % (ref 10–15)
EST. AVERAGE GLUCOSE BLD GHB EST-MCNC: 120 MG/DL
FASTING STATUS PATIENT QL REPORTED: ABNORMAL
FASTING STATUS PATIENT QL REPORTED: ABNORMAL
FERRITIN SERPL-MCNC: 268 NG/ML (ref 11–328)
GLUCOSE SERPL-MCNC: 93 MG/DL (ref 70–99)
HBA1C MFR BLD: 5.8 % (ref 0–5.6)
HCO3 SERPL-SCNC: 29 MMOL/L (ref 22–29)
HCT VFR BLD AUTO: 40.1 % (ref 35–47)
HDLC SERPL-MCNC: 46 MG/DL
HGB BLD-MCNC: 12.7 G/DL (ref 11.7–15.7)
HIV 1+2 AB+HIV1 P24 AG SERPL QL IA: NONREACTIVE
HOLD SPECIMEN: NORMAL
IRON BINDING CAPACITY (ROCHE): 242 UG/DL (ref 240–430)
IRON SATN MFR SERPL: 19 % (ref 15–46)
IRON SERPL-MCNC: 46 UG/DL (ref 37–145)
LDLC SERPL CALC-MCNC: 91 MG/DL
MCH RBC QN AUTO: 30.1 PG (ref 26.5–33)
MCHC RBC AUTO-ENTMCNC: 31.7 G/DL (ref 31.5–36.5)
MCV RBC AUTO: 95 FL (ref 78–100)
NONHDLC SERPL-MCNC: 117 MG/DL
PLATELET # BLD AUTO: 350 10E3/UL (ref 150–450)
POTASSIUM SERPL-SCNC: 3.5 MMOL/L (ref 3.4–5.3)
RBC # BLD AUTO: 4.22 10E6/UL (ref 3.8–5.2)
SODIUM SERPL-SCNC: 138 MMOL/L (ref 135–145)
TRIGL SERPL-MCNC: 132 MG/DL
WBC # BLD AUTO: 6.9 10E3/UL (ref 4–11)

## 2025-04-15 RX ORDER — METOPROLOL SUCCINATE 100 MG/1
100 TABLET, EXTENDED RELEASE ORAL DAILY
Qty: 90 TABLET | Refills: 0 | Status: SHIPPED | OUTPATIENT
Start: 2025-04-15

## 2025-04-15 RX ORDER — CHLORTHALIDONE 25 MG/1
25 TABLET ORAL DAILY
Qty: 90 TABLET | Refills: 0 | Status: SHIPPED | OUTPATIENT
Start: 2025-04-15

## 2025-04-15 RX ORDER — VARENICLINE TARTRATE 0.5 MG/1
TABLET, FILM COATED ORAL
Qty: 95 TABLET | Refills: 0 | Status: SHIPPED | OUTPATIENT
Start: 2025-04-15

## 2025-04-15 RX ORDER — LISINOPRIL 20 MG/1
20 TABLET ORAL DAILY
Qty: 90 TABLET | Refills: 0 | Status: SHIPPED | OUTPATIENT
Start: 2025-04-15

## 2025-04-15 RX ORDER — ATORVASTATIN CALCIUM 40 MG/1
40 TABLET, FILM COATED ORAL DAILY
Qty: 90 TABLET | Refills: 0 | Status: SHIPPED | OUTPATIENT
Start: 2025-04-15

## 2025-04-15 RX ORDER — OMEPRAZOLE 40 MG/1
CAPSULE, DELAYED RELEASE ORAL
Qty: 90 CAPSULE | Refills: 0 | Status: SHIPPED | OUTPATIENT
Start: 2025-04-15

## 2025-04-15 RX ORDER — VARENICLINE TARTRATE 1 MG/1
1 TABLET, FILM COATED ORAL 2 TIMES DAILY
Qty: 60 TABLET | Refills: 1 | Status: SHIPPED | OUTPATIENT
Start: 2025-05-14

## 2025-04-15 NOTE — PROGRESS NOTES
Assessment & Plan     HTN (hypertension), benign  - Blood pressure is controlled with current medication regimen.  - Refill prescriptions for metoprolol, lisinopril, and chlorthalidone.    Chronic GERD  - Omeprazole is effective for managing symptoms.  - Continue omeprazole; consider switching to a cheaper option at [facility].    Hyperlipidemia with target LDL less than 130  - Check cholesterol levels with labs.    Screening for cardiovascular condition  - Conduct necessary labs.    Allergic rhinitis seasonal from pollen  -Patient has a significant nasal congestion and allergic shiners encouraged her to use over-the-counter cetirizine or loratadine she does have Flonase on her list and I recommended a twice daily usage  Screening for HIV (human immunodeficiency virus)  - Conduct necessary labs.    Body mass index (BMI) 45.0-49.9, adult (H)  - Significant weight loss reported; patient is using weight management medication.  - Continue current weight management plan; follow-up with weight management doctor in June.    Nicotine dependence, uncomplicated, unspecified nicotine product type  - Patient is interested in quitting smoking.  - Discussed the option of using varenicline with a gradual quit plan.  - Risks and side effects: Rarely can cause depression, but generally well-tolerated.    Consent was obtained from the patient to use an AI documentation tool in the creation of this note.    Molly was seen today for medication follow-up and ear pressure.    Diagnoses and all orders for this visit:    HTN (hypertension), benign  -     BASIC METABOLIC PANEL; Future  -     metoprolol succinate ER (TOPROL XL) 100 MG 24 hr tablet; Take 1 tablet (100 mg) by mouth daily. PLEASE FOLLOW UP BEFORE NEXT REFILL  -     lisinopril (ZESTRIL) 20 MG tablet; Take 1 tablet (20 mg) by mouth daily. PLEASE FOLLOW UP BEFORE NEXT REFILL  -     chlorthalidone (HYGROTON) 25 MG tablet; Take 1 tablet (25 mg) by mouth daily. PLEASE  FOLLOW UP BEFORE NEXT REFILL  -     CBC with Platelets and Reflex to Iron Studies; Future  -     Iron & Iron Binding Capacity  -     Ferritin    Chronic GERD  -     omeprazole (PRILOSEC) 40 MG DR capsule; TAKE 1 CAPSULE(40 MG) BY MOUTH DAILY 30 TO 60 MINUTES BEFORE A MEAL PLEASE FOLLOW UP BEFORE NEXT REFILL    Hyperlipidemia with target LDL less than 130  -     Lipid panel reflex to direct LDL Non-fasting; Future  -     atorvastatin (LIPITOR) 40 MG tablet; Take 1 tablet (40 mg) by mouth daily. PLEASE FOLLOW UP BEFORE NEXT REFILL    Screening for cardiovascular condition    Screening for HIV (human immunodeficiency virus)  -     HIV Screening; Future    Body mass index (BMI) 45.0-49.9, adult (H)  -     Hemoglobin A1c; Future    Nicotine dependence, uncomplicated, unspecified nicotine product type  -     [place] Quit Partner Referral; Future  -     varenicline (CHANTIX) 0.5 MG tablet; Take 0.5 mg (1 tablet) once a day for 3 days, THEN 0.5 mg (1 tablet) twice daily for 4 days, THEN 1.0 mg (2 tablets) twice daily  -     varenicline (CHANTIX) 1 MG tablet; Take 1 tablet (1 mg) by mouth 2 times daily.    Seasonal allergic rhinitis due to pollen    Other orders  -     Pneumococcal 20 Valent Conjugate (PCV20)       The longitudinal plan of care for the diagnosis(es)/condition(s) as documented were addressed during this visit. Due to the added complexity in care, I will continue to support Molly in the subsequent management and with ongoing continuity of care.  I spent a total of 31 minutes on the day of the visit.   Time spent by me today doing chart review, history and exam, documentation and further activities per the note         BMI  Estimated body mass index is 44.2 kg/m  as calculated from the following:    Height as of this encounter: 1.829 m (6').    Weight as of this encounter: 147.8 kg (325 lb 14.4 oz).   Weight management plan: Patient has [employer] employee Insurance plan and was referred to the [facility] Weight Management  Program GLP-1 Weight Loss RX Criteria    Lab Results   Component Value Date    A1C 5.8 04/15/2025    A1C 6.1 11/16/2023    A1C 6.1 02/08/2022    A1C 6.1 03/22/2021    A1C 5.9 06/10/2014       Return in about 6 months (around 10/15/2025).    Subjective   Molly is a 64 year old, presenting for the following health issues:  Medication Follow-up and Ear Pressure        4/15/2025    11:19 AM   Additional Questions   Roomed by Hans         4/15/2025    Information    services provided? No     HPI Molly Aldridge, 64 years    Sinus issues  - History of sinus problems, not allergies  - Father had a lot of allergies  - Experiences pressure relief when sneezing  - Reports darkness under eyes, possibly related to sinuses  - No tenderness, just fullness in sinuses    Allergies  - Uses Flonase as needed  - Does not take cetirizine or other allergy medications regularly  - Reports allergic shiners and a line on the nose from allergies  - Allergies are persistent and not worse in the last month    Smoking  - Currently smoking  - Has tried using nicotine patches to quit  - Can work 8 hours without smoking    Weight management  - Using Zepbound for weight management  - Has an appointment with weight management in June  - Reports significant weight loss, approximately 50 lbs    Family stress  - Sister passed away in January  - Concerned about mother's well-being    Ear issues  - Reports clogged ears, suspected ear infection due to wax buildup  - Uses warm water in the shower to manage ear wax    Medication  - Takes metoprolol, lisinopril, chlorthalidone, and omeprazole  - Reports controlled blood pressure with medication  - Takes vitamin D pills, does not take iron pills    Recent mammogram  - Mammogram performed on April 14, 2025 at [facility], across the street from [facility]  - Has not received results yet                  Objective    /85   Pulse 83   Temp 97.6  F (36.4  C) (Temporal)   Resp 16    Ht 1.829 m (6')   Wt (!) 147.8 kg (325 lb 14.4 oz)   LMP 09/06/2012   SpO2 100%   BMI 44.20 kg/m    Body mass index is 44.2 kg/m .  Physical Exam  Constitutional:       Appearance: She is well-developed. She is not ill-appearing or diaphoretic.   HENT:      Head: Normocephalic.      Comments: Bilateral allergic shiners     Right Ear: Tympanic membrane and external ear normal.      Left Ear: Tympanic membrane and external ear normal.      Nose: Mucosal edema, congestion and rhinorrhea present.      Right Sinus: No maxillary sinus tenderness or frontal sinus tenderness.      Left Sinus: No maxillary sinus tenderness or frontal sinus tenderness.   Eyes:      Conjunctiva/sclera: Conjunctivae normal.      Pupils: Pupils are equal, round, and reactive to light.   Pulmonary:      Effort: Pulmonary effort is normal. No respiratory distress.      Breath sounds: Normal breath sounds. No stridor.   Abdominal:      Palpations: Abdomen is soft.   Musculoskeletal:      Cervical back: Normal range of motion.   Neurological:      Mental Status: She is alert.                    Signed Electronically by: Alex Rosas MD

## 2025-04-19 ENCOUNTER — HEALTH MAINTENANCE LETTER (OUTPATIENT)
Age: 65
End: 2025-04-19

## 2025-05-12 ENCOUNTER — PATIENT OUTREACH (OUTPATIENT)
Dept: CARE COORDINATION | Facility: CLINIC | Age: 65
End: 2025-05-12
Payer: COMMERCIAL

## 2025-06-03 ENCOUNTER — VIRTUAL VISIT (OUTPATIENT)
Dept: PHARMACY | Facility: CLINIC | Age: 65
End: 2025-06-03
Attending: PHYSICIAN ASSISTANT
Payer: COMMERCIAL

## 2025-06-03 VITALS — BODY MASS INDEX: 43 KG/M2 | HEIGHT: 72 IN

## 2025-06-03 DIAGNOSIS — E66.01 MORBID OBESITY (H): Primary | ICD-10-CM

## 2025-06-03 DIAGNOSIS — F17.200 TOBACCO USE DISORDER: ICD-10-CM

## 2025-06-03 RX ORDER — TOPIRAMATE 25 MG/1
TABLET, FILM COATED ORAL
Qty: 90 TABLET | Refills: 2 | Status: ACTIVE | OUTPATIENT
Start: 2025-06-03

## 2025-06-03 RX ORDER — FEXOFENADINE HCL 180 MG/1
180 TABLET ORAL DAILY
COMMUNITY

## 2025-06-03 ASSESSMENT — PAIN SCALES - GENERAL: PAINLEVEL_OUTOF10: NO PAIN (0)

## 2025-06-03 NOTE — NURSING NOTE
Current patient location: [address]   [facility] [address]    Is the patient currently in the state of [place]? YES    Visit mode: VIDEO    If the visit is dropped, the patient can be reconnected by:VIDEO VISIT: Text to cell phone:   Telephone Information:   Mobile [phone]       Will anyone else be joining the visit? NO  (If patient encounters technical issues they should call [phone]    Are changes needed to the allergy or medication list? No    Are refills needed on medications prescribed by this physician? Discuss with provider    Rooming Documentation:  Questionnaire(s) not pre-assigned    Reason for visit: Medication Therapy Management    Pt states no weight available within last week.    Milton BOWMANF

## 2025-06-03 NOTE — PATIENT INSTRUCTIONS
"Recommendations from MTM Pharmacist visit:                                                    MTM (medication therapy management) is a service provided by a clinical pharmacist designed to help you get the most of out of your medicines.  You may be sent a phone or email survey evaluating today's visit.  Please provide feedback you have for the service he received today if you are able.      Start topiramate now and phentermine when Dr. Calderon and insurance approves  - Order sent to [facility]   - review topiramate and phentermine information (see below)    - check blood pressure at [facility] 1-2 weeks after starting phentermine (see below)   - reach out if having any intolerable side effects     [facility] offers blood pressure checks and they will put this reading into your chart for your care team to review, can schedule appointment at [email or web address] or by calling   - please check blood pressure 1-2 weeks after starting phentermine     Protein goal:  20-30 grams of protein per meal, at least 60 grams per day     Water goal:  at least 64 oz per day (two of your big cups per day)    Follow-up:  - Next MTM 7/29/25 at 10 am Rebecca Jiménez, PharmD    - Next provider 6/9/25  Joe Calderon MD     It was great speaking with you today.  I value your experience and would be very thankful for your time in providing feedback in our clinic survey. In the next few days, you may receive an email or text message from [employer] with a link to a survey related to your  clinical pharmacist.\"     To schedule another MTM appointment, please call the clinic directly ([facility] Phone Number: [phone] (schedules for [facility] and [facility] - providers, dietitians, health coaches) or you may call the MTM scheduling line at [phone] or toll-free at [phone].     My Clinical Pharmacist's contact information:              "                                         Please feel free to contact me with any questions or concerns you have.      Rebecca Jiménez, PharmD    Medication Therapy Management Pharmacist   [facility]    TOPIRAMATE (TOPAMAX)    We are considering starting topiramate as part of your weight management plan. This medication is often used to treat migraines or seizures but has also been found to help with weight loss. While it is not FDA-approved for weight loss on its own, it has been safely used off-label for this purpose for many years.    How topiramate works:  Topiramate is believed to affect certain brain pathways that influence appetite, cravings, and eating behaviors. Many people on topiramate feel less interested in food between meals, have fewer cravings (especially for sugary drinks like soda), and find it easier to stop eating when full. Some people notice these effects right away. Others may not feel much different but still lose weight over time. Like all weight loss medications, topiramate works best when you support it with healthy habits.    Tips for taking topiramate:   Always take topiramate exactly as prescribed.   Do not stop the medication abruptly without talking to your provider.   Drink plenty of water throughout the day to prevent kidney stones, a known but rare side effect.   If prescribed as once daily dosing, ensure to take in the evening to help with potential daytime drowsiness.    Tips to help topiramate work best:  Keep fewer tempting high-calorie foods in the house or office.  Choose lower-calorie foods like fruits, vegetables, and lean proteins for snacks.  Eat out only one time or less each week  Eat meals at a table without distractions like the TV or computer.    Common Side Effects:  Topiramate is generally well tolerated. Common side effects may include: Tingling in the hands, feet, or face (usually temporary and not bothersome),  word-finding difficulty or mental fogginess (affects about 1 in 10 people), feeling sleepy, groggy, or  off  when starting the medication (usually goes away within days), dry mouth or changes in taste. Let your provider know if any side effects become bothersome.    Rare Serious Side Effects:   Call us or seek care if you experience:  Vision changes or eye pain (this could be a rare sign of increased eye pressure), severe mood changes or depression, kidney stones (severe pain in the side or back, blood in urine), signs of an allergic reaction (rash, swelling, difficulty breathing), or confusion or severe drowsiness    Important for Women of Childbearing Age:  Topiramate may increase the risk of birth defects, especially cleft lip or palate. If you are of childbearing age, you must use effective birth control or avoid pregnancy while taking this medication. Talk to your provider about a plan that s right for you.      For any questions or concerns please send a [employer] message to our team or call our weight management call center at [phone] during regular business hours. For questions during evenings or weekends your messages will be addressed during the next business day.  For emergencies please call 911 or seek immediate medical care.      In order to get refills of this or any medication we prescribe you must be seen in the medical weight mgmt clinic every 2-3 months.       PHENTERMINE    We are considering starting phentermine as part of your weight  management plan. This medication is often used for people who struggle with hunger or overeating, especially when combined with lifestyle changes like healthy eating and regular activity.    How phentermine works: Phentermine works by reducing hunger. It stimulates certain parts of your brain, which helps suppress your appetite and increase your energy. You may notice feeling less hungry throughout the day, it s easier to stop eating when full, and you may feel  more motivated or energetic. Some people feel this right away. Others don t notice much change but may still lose weight over time. Like all weight loss medications, phentermine works best when you support it with healthy habits.    Tips for taking phentermine:   Take in the morning, usually once daily . Avoid taking it late in the day as it can interfere with sleep.  Drink plenty of water throughout the day.  Avoid or limit alcohol and other stimulants like caffeine if possible.  Do not take more than prescribed. Taking more does not increase weight loss and can be dangerous.    For some of our patients, these feelings are very real and immediate. For other patients, the feelings are less obvious. They don't feel much of a change but find they've lost weight. Like all weight loss medications, Phentermine  works best when you help it work. This means:  1. Having less tempting high calorie (fattening) food around the house or office. (For people with strong cravings this is very important.)   2. Staying away from situations or people that may trigger your cravings .   3. Eating out only one time or less each week.  4. Eating your meals at a table with the TV or computer off.    Common Side Effects:   Phentermine is generally well tolerated. Side effects tend to be mild and may lessen over time. Common side effects include:  Dry mouth, trouble sleeping, increased energy or restlessness, mild anxiety or irritability, increased heart rate or slightly elevated blood pressure. If you feel any racing heart or chest discomfort, let your provider know.    Rare Serious Side Effects:   Call us or seek care if you experience any of these: severe chest pain, shortness of breath, fainting or severe dizziness, uncontrolled high blood pressure, mental changes (such as severe agitation).     For any questions or concerns please send a [employer] message to our team or call our weight management call center at [phone] during  regular business hours. For questions during evenings or weekends your messages will be addressed during the next business day.  For emergencies please call 911 or seek immediate medical care.      In order to get refills of this or any medication we prescribe you must be seen in the medical weight mgmt clinic every 2-3 months.

## 2025-06-03 NOTE — Clinical Note
One pen remaining of Zepbound due to using extended dose intervals to extend 3 month supply from December.  Will proceed with plan to start topiramate and phentermine, placed topiramate order today, start phentermine when you sign order (routed to you in separate encounter) and verify covered by insurance.  Will plan to go to [facility] for blood pressure check 1-2 weeks after.  Could consider BMP in 3-6 months for renal monitoring, baseline BMP within normal limits.  Dr. Calderon 6/9, MTM 7/29 - Rebecca Grady, MTM

## 2025-06-03 NOTE — PROGRESS NOTES
Medication Therapy Management (MTM) Encounter    ASSESSMENT:                            Medication Adherence/Access: See below for considerations.    Weight Management:   Molly would benefit from transition therapy with Zepbound to phentermine and topi.  Is tolerating Zepbound therapy and is effective for weight management, however, is not financially sustainable. She has one dose left of Zepbound from 3 month supply obtained in December prior to coverage ending, she has been utilizing extended dose interval to extend therapy.  Discussed phentermine and topiramate in depth, mechanism of action, side effects, administration, dosing, and efficacy. Will initiate topiramate now and phentermine when authorized by Dr. Calderon and coverage approved.  Recommend [facility] blood pressure check 1-2 weeks after starting phentermine, she is agreeable, works at [employer].  Could consider BMP 3-6 months after starting topiramate for renal monitoring.  Education provided on continued dietary and behavioral modifications with focus on protein and water intake to optimize effectiveness of medication therapy.     Tobacco Dependence:  Not interested in medication therapy or quitting at this time, will consider MTM visit if would like assistance in future.     PLAN:                            Start topiramate now and phentermine when Dr. Calderon and insurance approves  - Order sent to [facility]   - review topiramate and phentermine information   - check blood pressure at [facility] 1-2 weeks after starting phentermine (see below)   - reach out if having any intolerable side effects     [facility] offers blood pressure checks and they will put this reading into your chart for your care team to review, can schedule appointment at [email or web address] or by calling   - please check blood pressure 1-2 weeks after starting phentermine     Protein goal:  20-30 grams of protein per  meal, at least 60 grams per day     Water goal:  at least 64 oz per day (two of your big cups per day)    Follow-up:  - Next MTM 7/29/25 at 10 am Neymar RodriguezD    - Next provider 6/9/25  Joe Calderon MD     SUBJECTIVE/OBJECTIVE:                          Molly Aldridge is a 64 year old female seen for a follow-up visit. She was referred to me from Kary Cadet PA-C.      Reason for visit: medical weight management     Allergies/ADRs: Reviewed in chart  Past Medical History: Reviewed in chart  Tobacco: She reports that she has been smoking cigarettes. She has never used smokeless tobacco.Nicotine/Tobacco Cessation Plan  Information offered: Patient not interested at this time    Alcohol: 1-2 beverages / week  Caffeine: no coffee, tries to minimize soda    Medication Adherence/Access:   - Patient takes medications directly from bottles.  - Patient takes medications 1 time(s) per day.   - Per patient, misses medication 0 time(s) per week.   - prefers [facility]    Weight Management   - Zepbound 10 mg once every 10-14 days   - med start:  6/2024, dose start: 12/2024   - has one pen left   - omeprazole 40 mg once daily     No injection site reaction, no nausea, no vomiting, no constipation (baseline: 1 BM / day, now regularly but not sure frequency), intermittent diarrhea (depends on what she eats), no heartburn, no headache, and no fatigue.     Experiencing appetite suppression / portion reduction (likes that is suppresses her appetite), and reduction with food noise / cravings (buys less junk food, passes the junk food aisle at the store).     Still interested in transitioning to phentermine and topiramate. Does not monitor blood pressure at home, takes blood pressure medication consistently.  Discussed [facility] for blood pressure check.  Discussed phentermine and topiramate in depth, would like to start therapies.     Nutrition/Eating Habits:      - 06/03/25 Changes from last  "visit: none    - 12/27/24 Meal times:     - Breakfast:  monroy, eggs, toast, fried potatoes, sausage    - Lunch:  lunchable, something small    - Dinner:  skips (goes to bed), pizza, burger, fish   - Snacks: stopped buying snacks b/c she's a junk food eater   - Hydration:  big cup at work (maybe 32 Oz) x2 per day   Sleep:  7 hrs, mostly rested  Exercise/Activity: on feet at work, no extra activities outside of work, has not set any goals.   Medications Tried/Failed:  None.     - Last provider visit: 12/9/24 Joe Calderon MD    - Next provider 6/9/25  Joe Calderon MD     Initial Consult Weight: 355 lb (6/2024)     Current weight today: 0 lbs 0 oz  (last checked 3+  weeks ago, 323 lb / 324 lb)  Cumulative loss:  - 31 lb, - 8.7 %    Wt Readings from Last 4 Encounters:   04/15/25 (!) 325 lb 14.4 oz (147.8 kg)   06/20/24 (!) 355 lb (161 kg)   06/17/24 (!) 355 lb (161 kg)   04/10/24 (!) 365 lb 4.8 oz (165.7 kg)     Estimated body mass index is 43 kg/m  as calculated from the following:    Height as of this encounter: 6' 1\" (1.854 m).    Weight as of 4/15/25: 325 lb 14.4 oz (147.8 kg).    Tobacco Dependence:  - nicotine patch 14 mg: not currently using  - chantix: not using, on file at pharmacy    Feels when she is ready to quit will just do cold turkey. Currently half pack per day, does not smoke while at work, sometimes 16 hr shifts.  Not interested in MTM assistance in quitting at this time.       Today's Vitals: Ht 6' 1\" (1.854 m)   LMP 09/06/2012   BMI 43.00 kg/m    ----------------    I spent 39 minutes with this patient today. All changes were made via collaborative practice agreement with Kary Cadet and Joe Calderon MD.     A summary of these recommendations was sent via MyChart.    Rebecca Jiménez, PharmD    Medication Therapy Management Pharmacist  [facility]      Telemedicine Visit Details  The patient's medications can be safely assessed " via a telemedicine encounter.  Type of service:  Video Conference via [employer]  Originating Location (pt. Location): Home    Distant Location (provider location):  Off-site  Start Time: 10:38 AM  End Time: 11:17 AM     Medication Therapy Recommendations  Morbid obesity (H)   1 Current Medication: ZEPBOUND 10 MG/0.5ML prefilled pen   Current Medication Sig: Inject 0.5 mLs (10 mg) subcutaneously every 7 days.   Rationale: Cannot afford medication product - Cost - Adherence   Recommendation: Change Medication - phentermine 8 MG tablet - routed order to provider in separate orders only encounter, last visit provider agreed with phentermine start.   Status: Accepted - no CPA Needed   Identified Date: 6/3/2025 Completed Date: 6/3/2025         2 Rationale: Synergistic therapy - Needs additional medication therapy - Indication   Recommendation: Start Medication - topiramate 25 MG tablet   Status: Accepted per CPA   Identified Date: 6/3/2025 Completed Date: 6/3/2025

## 2025-06-09 ENCOUNTER — VIRTUAL VISIT (OUTPATIENT)
Dept: ENDOCRINOLOGY | Facility: CLINIC | Age: 65
End: 2025-06-09
Payer: COMMERCIAL

## 2025-06-09 DIAGNOSIS — E66.01 MORBID OBESITY (H): Primary | ICD-10-CM

## 2025-06-09 RX ORDER — PHENTERMINE HYDROCHLORIDE 15 MG/1
15 CAPSULE ORAL EVERY MORNING
Qty: 30 CAPSULE | Refills: 5 | Status: SHIPPED | OUTPATIENT
Start: 2025-06-09

## 2025-06-09 NOTE — NURSING NOTE
Current [place] location: [place]    Is the patient currently in the state of [place]? YES    Visit mode: VIDEO    If the visit is dropped, the patient can be reconnected by:VIDEO VISIT: Text to cell phone:   Telephone Information:   Mobile [phone]       Will anyone else be joining the visit? NO  (If patient encounters technical issues they should call [phone])    Are changes needed to the allergy or medication list? No    Are refills needed on medications prescribed by this physician? NO    Rooming Documentation:  Questionnaire(s) completed    Reason for visit: RECHECK    Yue DING

## 2025-06-09 NOTE — PROGRESS NOTES
Virtual Visit Details    Joined the call at 6/9/2025, 9:41:22 am.  Left the call at 6/9/2025, 9:47:15 a    Originating Location (pt. Location): Home    Distant Location (provider location):  Off-site  Platform used for Video Visit: Angelica

## 2025-06-09 NOTE — LETTER
2025       RE: Erlinda Aldridge  [address]  [facility] [address]     Dear Colleague,    Thank you for referring your patient, Erlinda Aldridge, to the [facility]NA[facility] [place] at [facility]. Please see a copy of my visit note below.    Virtual Visit Details    Joined the call at 2025, 9:41:22 am.  Left the call at 2025, 9:47:15 a    Originating Location (pt. Location): Home    Distant Location (provider location):  Off-site  Platform used for Video Visit: [facility] Medical Weight Management Note     rElinda Aldridge  MRN:  [id number]  :  1960  MIKAEL:  2024    Dear Alex Rosas MD,    I had the pleasure of seeing your patient Erlinda Aldridge.  She is a 64 year old female who I am continuing to see for treatment of obesity related to:      2024    10:00 AM   --   I have the following health issues associated with obesity High Blood Pressure    High Cholesterol   I have the following symptoms associated with obesity Knee Pain    Back Pain     CURRENT WEIGHT:   0 lbs 0 oz     Wt Readings from Last 4 Encounters:   04/15/25 (!) 147.8 kg (325 lb 14.4 oz)   24 (!) 161 kg (355 lb)   24 (!) 161 kg (355 lb)   04/10/24 (!) 165.7 kg (365 lb 4.8 oz)     Height:  Data Unavailable  Body Mass Index:  There is no height or weight on file to calculate BMI.  Vitals:  B/P: Data Unavailable, P: Data Unavailable    Initial consult weight was 355 on 24.  Weight change since last seen on 24 is down 18 pounds.   Total loss is 30 pounds.    INTERVAL HISTORY:  Taking now the last zepbound 10 mg/w and feels has been very helpful based on lowered appetite and looser clothes. Has not weighed for months.         2024    10:00 AM   Diet Recall Review with Patient   If you do eat breakfast, what types of food do you eat? monroy eggs toast with grape jelly and juice   If you do eat supper, what  types of food do you typically eat? sometime its hit and miss   If you do snack, what types of food do you typically eat? chips maybe soad or some water   How many glasses of juice do you drink in a typical day? 3   How many of glasses of milk do you drink in a typical day? 0   How many 8oz glasses of sugar containing drinks such as Abrahan-Aid/sweet tea do you drink in a day? 1   How many cans/bottles of sugar pop/soda/tea/sports drinks do you drink in a day? 2   How many cans/bottles of diet pop/soda/tea or sports drink do you drink in a day? 0   How often do you have a drink of alcohol? Monthly or Less   If you do drink, how many drinks might you have in a day? 1 or 2     MEDICATIONS:   Current Outpatient Medications   Medication Sig Dispense Refill     atorvastatin (LIPITOR) 40 MG tablet Take 1 tablet (40 mg) by mouth daily. PLEASE FOLLOW UP BEFORE NEXT REFILL 90 tablet 0     chlorthalidone (HYGROTON) 25 MG tablet Take 1 tablet (25 mg) by mouth daily. PLEASE FOLLOW UP BEFORE NEXT REFILL 90 tablet 0     COMPRESSION STOCKINGS 22-30mmHg, knee high stockings, wear as directed 2 each 1     fexofenadine (ALLEGRA) 180 MG tablet Take 180 mg by mouth daily.       fluticasone (FLONASE) 50 MCG/ACT nasal spray Spray 1-2 sprays into both nostrils daily 16 g 3     lisinopril (ZESTRIL) 20 MG tablet Take 1 tablet (20 mg) by mouth daily. PLEASE FOLLOW UP BEFORE NEXT REFILL 90 tablet 0     metoprolol succinate ER (TOPROL XL) 100 MG 24 hr tablet Take 1 tablet (100 mg) by mouth daily. PLEASE FOLLOW UP BEFORE NEXT REFILL 90 tablet 0     multivitamin (CENTRUM SILVER) tablet Take 1 tablet by mouth daily       nicotine (NICODERM CQ) 14 MG/24HR 24 hr patch Place 1 patch onto the skin every 24 hours (Patient not taking: Reported on 6/3/2025) 28 patch 3     omeprazole (PRILOSEC) 40 MG DR capsule TAKE 1 CAPSULE(40 MG) BY MOUTH DAILY 30 TO 60 MINUTES BEFORE A MEAL PLEASE FOLLOW UP BEFORE NEXT REFILL 90 capsule 0     topiramate (TOPAMAX) 25 MG  tablet Take 1 tablet by mouth once daily for 1 week, then 2 tablets once daily for 1 week, then 3 tablets once daily thereafter. Take at evening, may shift to bedtime if needed for sleepiness. 90 tablet 2     varenicline (CHANTIX) 0.5 MG tablet Take 0.5 mg (1 tablet) once a day for 3 days, THEN 0.5 mg (1 tablet) twice daily for 4 days, THEN 1.0 mg (2 tablets) twice daily (Patient not taking: Reported on 6/3/2025) 95 tablet 0     varenicline (CHANTIX) 1 MG tablet Take 1 tablet (1 mg) by mouth 2 times daily. (Patient not taking: Reported on 6/3/2025) 60 tablet 1     ZEPBOUND 10 MG/0.5ML prefilled pen Inject 0.5 mLs (10 mg) subcutaneously every 7 days. 6 mL 0     No current facility-administered medications for this visit.         6/9/2025     9:28 AM   Weight Loss Medication History Reviewed With Patient   Which weight loss medications are you currently taking on a regular basis? Topamax (topiramate)   If you are not taking a weight loss medication that was prescribed to you, please indicate why: Other   Are you having any side effects from the weight loss medication that we have prescribed you? No     ASSESSMENT:   Zepbound appears to be very good for her but she will lose this treatment so that [place] can keep more money. Go with phentermine and perhaps topiramate based on her budget.    FOLLOW-UP:    12 weeks.    Sincerely,  Joe Calderon MD    Again, thank you for allowing me to participate in the care of your patient.      Sincerely,    Joe Calderon MD

## 2025-06-09 NOTE — PROGRESS NOTES
Return Medical Weight Management Note     Erlinda Aldridge  MRN:  [id number]  :  1960  MIKAEL:  2024    Dear Alex Rosas MD,    I had the pleasure of seeing your patient Erlinda Aldridge.  She is a 64 year old female who I am continuing to see for treatment of obesity related to:      2024    10:00 AM   --   I have the following health issues associated with obesity High Blood Pressure    High Cholesterol   I have the following symptoms associated with obesity Knee Pain    Back Pain     CURRENT WEIGHT:   0 lbs 0 oz     Wt Readings from Last 4 Encounters:   04/15/25 (!) 147.8 kg (325 lb 14.4 oz)   24 (!) 161 kg (355 lb)   24 (!) 161 kg (355 lb)   04/10/24 (!) 165.7 kg (365 lb 4.8 oz)     Height:  Data Unavailable  Body Mass Index:  There is no height or weight on file to calculate BMI.  Vitals:  B/P: Data Unavailable, P: Data Unavailable    Initial consult weight was 355 on 24.  Weight change since last seen on 24 is down 18 pounds.   Total loss is 30 pounds.    INTERVAL HISTORY:  Taking now the last zepbound 10 mg/w and feels has been very helpful based on lowered appetite and looser clothes. Has not weighed for months.         2024    10:00 AM   Diet Recall Review with Patient   If you do eat breakfast, what types of food do you eat? monroy eggs toast with grape jelly and juice   If you do eat supper, what types of food do you typically eat? sometime its hit and miss   If you do snack, what types of food do you typically eat? chips maybe soad or some water   How many glasses of juice do you drink in a typical day? 3   How many of glasses of milk do you drink in a typical day? 0   How many 8oz glasses of sugar containing drinks such as Abrahan-Aid/sweet tea do you drink in a day? 1   How many cans/bottles of sugar pop/soda/tea/sports drinks do you drink in a day? 2   How many cans/bottles of diet pop/soda/tea or sports drink do you drink in a day? 0   How often do you have  a drink of alcohol? Monthly or Less   If you do drink, how many drinks might you have in a day? 1 or 2     MEDICATIONS:   Current Outpatient Medications   Medication Sig Dispense Refill    atorvastatin (LIPITOR) 40 MG tablet Take 1 tablet (40 mg) by mouth daily. PLEASE FOLLOW UP BEFORE NEXT REFILL 90 tablet 0    chlorthalidone (HYGROTON) 25 MG tablet Take 1 tablet (25 mg) by mouth daily. PLEASE FOLLOW UP BEFORE NEXT REFILL 90 tablet 0    COMPRESSION STOCKINGS 22-30mmHg, knee high stockings, wear as directed 2 each 1    fexofenadine (ALLEGRA) 180 MG tablet Take 180 mg by mouth daily.      fluticasone (FLONASE) 50 MCG/ACT nasal spray Spray 1-2 sprays into both nostrils daily 16 g 3    lisinopril (ZESTRIL) 20 MG tablet Take 1 tablet (20 mg) by mouth daily. PLEASE FOLLOW UP BEFORE NEXT REFILL 90 tablet 0    metoprolol succinate ER (TOPROL XL) 100 MG 24 hr tablet Take 1 tablet (100 mg) by mouth daily. PLEASE FOLLOW UP BEFORE NEXT REFILL 90 tablet 0    multivitamin (CENTRUM SILVER) tablet Take 1 tablet by mouth daily      nicotine (NICODERM CQ) 14 MG/24HR 24 hr patch Place 1 patch onto the skin every 24 hours (Patient not taking: Reported on 6/3/2025) 28 patch 3    omeprazole (PRILOSEC) 40 MG DR capsule TAKE 1 CAPSULE(40 MG) BY MOUTH DAILY 30 TO 60 MINUTES BEFORE A MEAL PLEASE FOLLOW UP BEFORE NEXT REFILL 90 capsule 0    topiramate (TOPAMAX) 25 MG tablet Take 1 tablet by mouth once daily for 1 week, then 2 tablets once daily for 1 week, then 3 tablets once daily thereafter. Take at evening, may shift to bedtime if needed for sleepiness. 90 tablet 2    varenicline (CHANTIX) 0.5 MG tablet Take 0.5 mg (1 tablet) once a day for 3 days, THEN 0.5 mg (1 tablet) twice daily for 4 days, THEN 1.0 mg (2 tablets) twice daily (Patient not taking: Reported on 6/3/2025) 95 tablet 0    varenicline (CHANTIX) 1 MG tablet Take 1 tablet (1 mg) by mouth 2 times daily. (Patient not taking: Reported on 6/3/2025) 60 tablet 1    ZEPBOUND 10  MG/0.5ML prefilled pen Inject 0.5 mLs (10 mg) subcutaneously every 7 days. 6 mL 0     No current facility-administered medications for this visit.         6/9/2025     9:28 AM   Weight Loss Medication History Reviewed With Patient   Which weight loss medications are you currently taking on a regular basis? Topamax (topiramate)   If you are not taking a weight loss medication that was prescribed to you, please indicate why: Other   Are you having any side effects from the weight loss medication that we have prescribed you? No     ASSESSMENT:   Zepbound appears to be very good for her but she will lose this treatment so that [place] can keep more money. Go with phentermine and perhaps topiramate based on her budget.    FOLLOW-UP:    12 weeks.    Sincerely,  Joe Calderon MD

## 2025-07-29 ENCOUNTER — VIRTUAL VISIT (OUTPATIENT)
Dept: PHARMACY | Facility: CLINIC | Age: 65
End: 2025-07-29
Attending: INTERNAL MEDICINE
Payer: COMMERCIAL

## 2025-07-29 DIAGNOSIS — E66.9 OBESITY: Primary | ICD-10-CM

## 2025-07-29 DIAGNOSIS — F17.200 TOBACCO USE DISORDER: ICD-10-CM

## 2025-07-29 DIAGNOSIS — E66.01 MORBID OBESITY (H): ICD-10-CM

## 2025-07-29 RX ORDER — TOPIRAMATE 25 MG/1
75 TABLET, FILM COATED ORAL AT BEDTIME
Qty: 270 TABLET | Refills: 1 | Status: SHIPPED | OUTPATIENT
Start: 2025-07-29

## 2025-07-29 NOTE — PATIENT INSTRUCTIONS
"Recommendations from MTM Pharmacist visit:                                                    MTM (medication therapy management) is a service provided by a clinical pharmacist designed to help you get the most of out of your medicines.  You may be sent a phone or email survey evaluating today's visit.  Please provide feedback you have for the service he received today if you are able.      Call [facility] to fill and start phentermine (take in the morning prior to breakfast)   - request they refill topiramate also  - Order sent to [facility], [phone]   - download RSens moi to help manage your medication   - check blood pressure at [facility] 1-2 weeks after starting phentermine   - reach out if having any intolerable side effects     Consider checking your weight at work to assess if topiramate is effective for weight loss, can send this information through JFrog     [facility] offers blood pressure checks and they will put this reading into your chart for your care team to review, can schedule appointment at [email or web address] or by calling   - please check blood pressure 1-2 weeks after starting phentermine     Protein goal:  20-30 grams protein / meal, minimum 60 gm, ideally 90 gm per day     Water goal:  at least 64 oz per day (two of your big cups per day)   - continue to focus on this, doing well!     Follow-up:  - Next MTM per Dr. Calderon recommend at next visit  (likely 2-3 months after your visit with him)     - JFrog message with scheduling tool   - Next provider visit: 11/24/25 Joe Calderon MD      It was great speaking with you today.  I value your experience and would be very thankful for your time in providing feedback in our clinic survey. In the next few days, you may receive an email or text message from [employer] with a link to a survey related to your  clinical pharmacist.\"     To schedule another MTM appointment, " please call the clinic directly ([facility] Phone Number: [phone] (schedules for [facility] and [facility] access hospital - providers, dietitians, health coaches) or you may call the [facility] scheduling line at [phone] or toll-free at [phone].     My Clinical Pharmacist's contact information:                                                      Please feel free to contact me with any questions or concerns you have.      Rebecca Jiménez, PharmD    Medication Therapy Management Pharmacist   [facility]

## 2025-07-29 NOTE — NURSING NOTE
Is the patient currently in the state of [place]? yes    Location: home    Visit mode:VIDEO    If the visit is dropped, the patient can be reconnected by: VIDEO VISIT: Text to cell phone:   Telephone Information:   Mobile [phone]       Will anyone else be joining the visit? NO  (If patient encounters technical issues they should call [phone])    Are changes needed to the allergy or medication list? No    Are refills needed on medications prescribed by this physician? NO    Reason for visit: Medication Therapy Management      Christi Ramirez, Virtual Visit Facilitator    QNR Status: completed

## 2025-07-29 NOTE — PROGRESS NOTES
Medication Therapy Management (MTM) Encounter    ASSESSMENT:                            Medication Adherence/Access: See below for considerations.    Weight Management:   Molly would benefit from continuing topiramate 75 mg once daily and starting phentermine 15 mg daily as was planned with previous visit with Dr. Calderon but did not yet  and start taking.  Tolerating topiramate start other than some sleepiness.  No recent weight to assess effectiveness thus far but clothing is fitting more loosely.   She has not noticed much change with discontinuing Zepbound thus far, was discontinued due to loss of coverage and out of pocket costs not financially sustainable. Recommend [facility] blood pressure check 1-2 weeks after starting phentermine, she is agreeable, works at [employer].  Could consider BMP 3-6 months after starting topiramate for renal monitoring.  Education provided on continued dietary and behavioral modifications with focus on protein and water intake to optimize effectiveness of medication therapy.     Tobacco Dependence:  Not interested in medication therapy or quitting at this time, will consider MTM visit if would like assistance in future.     PLAN:                            Call [facility] to fill and start phentermine (take in the morning prior to breakfast)   - request they refill topiramate also  - Order sent to [facility], [phone]   - download PopSeal moi to help manage your medication   - check blood pressure at [facility] 1-2 weeks after starting phentermine   - reach out if having any intolerable side effects     Consider checking your weight at work to assess if topiramate is effective for weight loss, can send this information through [employer]ia     [facility] offers blood pressure checks and they will put this reading into your chart for your care team to review, can schedule appointment at  [email or web address] or by calling   - please check blood pressure 1-2 weeks after starting phentermine     Protein goal:  20-30 grams protein / meal, minimum 60 gm, ideally 90 gm per day     Water goal:  at least 64 oz per day (two of your big cups per day)   - continue to focus on this, doing well!     Follow-up:  - Next MTM per Dr. Calderon recommend at next visit  (likely 2-3 months after your visit with him)     - [employer] message with scheduling tool   - Next provider visit: 11/24/25 Joe Calderon MD      SUBJECTIVE/OBJECTIVE:                          Molly Aldridge is a 64 year old female seen for a follow-up visit. She was referred to me from Kary Cadet PA-C.      Reason for visit:  medical weight management     Allergies/ADRs: Reviewed in chart  Past Medical History: Reviewed in chart  Tobacco: She reports that she has been smoking cigarettes. She has never used smokeless tobacco.Nicotine/Tobacco Cessation Plan  Information offered: Patient not interested at this time    Alcohol: 1-2 beverages / week  Caffeine: no coffee, tries to minimize soda    Medication Adherence/Access:   - Patient takes medications directly from bottles.  - Patient takes medications 1 time(s) per day.   - Per patient, misses medication 0 time(s) per week.   - prefers [facility]    Weight Management    - med start:  6/2024, dose start: 12/2024  - omeprazole 40 mg once daily   - topiramate 75 mg once daily prior to bed   - med start:  6/2025, start wt: 324 lb (estimate)   - phentermine 15 mg once daily    - Dr. Calderon ordered in June, not yet picked up or started    Just did a road trip to [facility] in [place].     Since starting topiramate was having some daytime drowsiness (improving), no tingling, no word finding / memory issues, and no taste changes.  For efficacy, feels it's working, clothing is fitting more loosely but has not checked weight recently.     No complaints on anything, thankful  "Zepbound was helpful and has not had increased hunger, food noise / cravings since stopping.     Nutrition/Eating Habits:      - 07/29/25 Changes from last visit: none despite stopping Zepbound     - 06/03/25 Changes from last visit: none    - 12/27/24 Meal times:     - Breakfast:  monroy, eggs, toast, fried potatoes, sausage    - Lunch:  lunchable, something small    - Dinner:  skips (goes to bed), pizza, burger, fish   - Snacks: stopped buying snacks b/c she's a junk food eater   - Hydration:  big cup at work (maybe 32 Oz) x2 per day   Sleep:  7 hrs, mostly rested  Exercise/Activity: on feet at work, no extra activities outside of work, has not set any goals.   Medications Tried/Failed:  None.     - Next provider visit: 11/24/25 Joe Calderon MD    - Last provider 6/9/25  Joe Calderon MD     Initial Consult Weight: 355 lb (6/17/2024)     Current weight today: 0 lbs 0 oz  (last checked 3+  weeks ago, 323 lb / 324 lb)    Cumulative loss:  - 31 lb, -  8.7 %    Wt Readings from Last 4 Encounters:   04/15/25 (!) 325 lb 14.4 oz (147.8 kg)   06/20/24 (!) 355 lb (161 kg)   06/17/24 (!) 355 lb (161 kg)   04/10/24 (!) 365 lb 4.8 oz (165.7 kg)     Estimated body mass index is 43 kg/m  as calculated from the following:    Height as of 6/3/25: 6' 1\" (1.854 m).    Weight as of 4/15/25: 325 lb 14.4 oz (147.8 kg).    BP Readings from Last 3 Encounters:   04/15/25 128/85   06/17/24 (!) 155/80   04/10/24 128/87     Tobacco Dependence:  - nicotine patch 14 mg: once daily    - has on hand but not using  - chantix: not using, heard bad things about it, prefers to try patch first as previously was helpful    Feels when she is ready to quit will just do cold turkey. Currently half pack per day, does not smoke while at work, sometimes 16 hr shifts.  Not interested in MTM assistance in quitting at this time.       Today's Vitals: LMP 09/06/2012   ----------------    I spent 37 minutes with this patient today. All " changes were made via collaborative practice agreement with Kary Cadet.     A summary of these recommendations was sent via CoreFlow.    Rebecca Jiménez, PharmD    Medication Therapy Management Pharmacist  [facility]      Telemedicine Visit Details  The patient's medications can be safely assessed via a telemedicine encounter.  Type of service:  Video Conference via AmWell  Originating Location (pt. Location): Home    Distant Location (provider location):  Off-site  Start Time: 10:06 AM  End Time: 10:43 AM     Medication Therapy Recommendations  Obesity   1 Current Medication: phentermine 15 MG capsule   Current Medication Sig: Take 1 capsule (15 mg) by mouth every morning.   Rationale: Dose too low - Dosage too low - Effectiveness   Recommendation: Increase Frequency - start therapy that was prescribed by provider at last visit   Status: Patient Agreed - Adherence/Education   Identified Date: 7/29/2025 Completed Date: 7/29/2025

## 2025-07-29 NOTE — Clinical Note
Doing well on topiramate, no weight to assess but reports clothing fitting looser and only side effect of sleepiness.  Has not yet started phentermine, did not get notified was ready by pharmacy so I added her to their text notifications.  She will fill / start now and agreeable to blood pressure check 1-2 weeks after at pharmacy whom adds to EPIC.  Could consider BMP at your next visit 11/24, MTM per your recommendation (2-3 months after your visit) - Rebecca Grady, MTM 
[Negative] : Genitourinary
[Negative] : Genitourinary

## 2025-08-05 DIAGNOSIS — E78.5 HYPERLIPIDEMIA WITH TARGET LDL LESS THAN 130: ICD-10-CM

## 2025-08-05 DIAGNOSIS — I10 HTN (HYPERTENSION), BENIGN: Chronic | ICD-10-CM

## 2025-08-05 DIAGNOSIS — K21.9 CHRONIC GERD: ICD-10-CM

## 2025-08-06 RX ORDER — METOPROLOL SUCCINATE 100 MG/1
100 TABLET, EXTENDED RELEASE ORAL DAILY
Qty: 90 TABLET | Refills: 1 | Status: SHIPPED | OUTPATIENT
Start: 2025-08-06

## 2025-08-06 RX ORDER — ATORVASTATIN CALCIUM 40 MG/1
40 TABLET, FILM COATED ORAL DAILY
Qty: 90 TABLET | Refills: 1 | Status: SHIPPED | OUTPATIENT
Start: 2025-08-06

## 2025-08-06 RX ORDER — LISINOPRIL 20 MG/1
20 TABLET ORAL DAILY
Qty: 90 TABLET | Refills: 1 | Status: SHIPPED | OUTPATIENT
Start: 2025-08-06

## 2025-08-06 RX ORDER — OMEPRAZOLE 40 MG/1
CAPSULE, DELAYED RELEASE ORAL
Qty: 90 CAPSULE | Refills: 1 | Status: SHIPPED | OUTPATIENT
Start: 2025-08-06

## 2025-08-06 RX ORDER — CHLORTHALIDONE 25 MG/1
25 TABLET ORAL DAILY
Qty: 90 TABLET | Refills: 1 | Status: SHIPPED | OUTPATIENT
Start: 2025-08-06